# Patient Record
Sex: FEMALE
[De-identification: names, ages, dates, MRNs, and addresses within clinical notes are randomized per-mention and may not be internally consistent; named-entity substitution may affect disease eponyms.]

---

## 2021-08-18 ENCOUNTER — NURSE TRIAGE (OUTPATIENT)
Dept: OTHER | Facility: CLINIC | Age: 41
End: 2021-08-18

## 2021-08-18 NOTE — TELEPHONE ENCOUNTER
Pt states she had a vein surgery done and followed up with the physician who performed the procedure today. Pt states she has some swelling and pain to leg after after having procedure. Pt states she was was given Voltaren, atb and was told by physician to put heat on leg. Writer advised to follow physicians instructions and  if any worsening symptoms to call nurse line back or f/u with physician who did procedure. Pt also advised to f/u with PCP if needed. Pt verbalizes understanding.

## 2023-03-22 PROBLEM — N39.3 STRESS INCONTINENCE, FEMALE: Status: ACTIVE | Noted: 2023-03-22

## 2023-03-22 PROBLEM — L23.7 CONTACT DERMATITIS DUE TO POISON IVY: Status: ACTIVE | Noted: 2023-03-22

## 2023-03-22 PROBLEM — L30.9 ECZEMA OF HAND: Status: ACTIVE | Noted: 2023-03-22

## 2023-03-22 PROBLEM — N94.6 DYSMENORRHEA: Status: ACTIVE | Noted: 2023-03-22

## 2023-03-22 PROBLEM — O35.BXX0 ABNORMAL FETAL ECHOCARDIOGRAM AFFECTING ANTEPARTUM CARE OF MOTHER (HHS-HCC): Status: ACTIVE | Noted: 2023-03-22

## 2023-03-22 PROBLEM — R50.9 FEVER: Status: ACTIVE | Noted: 2023-03-22

## 2023-03-22 PROBLEM — R59.0 CERVICAL ADENOPATHY: Status: ACTIVE | Noted: 2023-03-22

## 2023-03-22 RX ORDER — CHLORHEXIDINE GLUCONATE ORAL RINSE 1.2 MG/ML
15 SOLUTION DENTAL
COMMUNITY
Start: 2022-04-29 | End: 2023-03-28

## 2023-03-22 RX ORDER — CHLORHEXIDINE GLUCONATE 40 MG/ML
SOLUTION TOPICAL
COMMUNITY
Start: 2022-04-29 | End: 2023-03-28

## 2023-03-24 RX ORDER — MULTIVITAMIN
1 TABLET ORAL DAILY
COMMUNITY

## 2023-03-26 ASSESSMENT — PROMIS GLOBAL HEALTH SCALE
RATE_GENERAL_HEALTH: VERY GOOD
RATE_AVERAGE_FATIGUE: MILD
RATE_SOCIAL_SATISFACTION: GOOD
EMOTIONAL_PROBLEMS: SOMETIMES
CARRYOUT_PHYSICAL_ACTIVITIES: COMPLETELY
CARRYOUT_SOCIAL_ACTIVITIES: VERY GOOD
RATE_AVERAGE_PAIN: 1
RATE_QUALITY_OF_LIFE: VERY GOOD
RATE_MENTAL_HEALTH: GOOD
RATE_PHYSICAL_HEALTH: VERY GOOD

## 2023-03-28 ENCOUNTER — LAB (OUTPATIENT)
Dept: LAB | Facility: LAB | Age: 43
End: 2023-03-28
Payer: COMMERCIAL

## 2023-03-28 ENCOUNTER — OFFICE VISIT (OUTPATIENT)
Dept: PRIMARY CARE | Facility: CLINIC | Age: 43
End: 2023-03-28
Payer: COMMERCIAL

## 2023-03-28 VITALS
TEMPERATURE: 98.2 F | HEART RATE: 58 BPM | HEIGHT: 67 IN | DIASTOLIC BLOOD PRESSURE: 72 MMHG | OXYGEN SATURATION: 98 % | WEIGHT: 184 LBS | BODY MASS INDEX: 28.88 KG/M2 | SYSTOLIC BLOOD PRESSURE: 108 MMHG

## 2023-03-28 DIAGNOSIS — Z00.00 ROUTINE GENERAL MEDICAL EXAMINATION AT A HEALTH CARE FACILITY: ICD-10-CM

## 2023-03-28 DIAGNOSIS — Z13.220 LIPID SCREENING: ICD-10-CM

## 2023-03-28 DIAGNOSIS — K59.00 CONSTIPATION, UNSPECIFIED CONSTIPATION TYPE: ICD-10-CM

## 2023-03-28 DIAGNOSIS — M79.672 FOOT PAIN, LEFT: ICD-10-CM

## 2023-03-28 DIAGNOSIS — Z00.00 ROUTINE GENERAL MEDICAL EXAMINATION AT A HEALTH CARE FACILITY: Primary | ICD-10-CM

## 2023-03-28 DIAGNOSIS — M54.2 NECK PAIN: ICD-10-CM

## 2023-03-28 DIAGNOSIS — R63.5 WEIGHT GAIN: ICD-10-CM

## 2023-03-28 DIAGNOSIS — R53.83 FATIGUE, UNSPECIFIED TYPE: ICD-10-CM

## 2023-03-28 DIAGNOSIS — N93.8 DUB (DYSFUNCTIONAL UTERINE BLEEDING): ICD-10-CM

## 2023-03-28 LAB
ALANINE AMINOTRANSFERASE (SGPT) (U/L) IN SER/PLAS: 21 U/L (ref 7–45)
ALBUMIN (G/DL) IN SER/PLAS: 4.4 G/DL (ref 3.4–5)
ALKALINE PHOSPHATASE (U/L) IN SER/PLAS: 48 U/L (ref 33–110)
ANION GAP IN SER/PLAS: 11 MMOL/L (ref 10–20)
ASPARTATE AMINOTRANSFERASE (SGOT) (U/L) IN SER/PLAS: 21 U/L (ref 9–39)
BILIRUBIN TOTAL (MG/DL) IN SER/PLAS: 0.7 MG/DL (ref 0–1.2)
CALCIUM (MG/DL) IN SER/PLAS: 10 MG/DL (ref 8.6–10.3)
CARBON DIOXIDE, TOTAL (MMOL/L) IN SER/PLAS: 31 MMOL/L (ref 21–32)
CHLORIDE (MMOL/L) IN SER/PLAS: 101 MMOL/L (ref 98–107)
CHOLESTEROL (MG/DL) IN SER/PLAS: 197 MG/DL (ref 0–199)
CHOLESTEROL IN HDL (MG/DL) IN SER/PLAS: 82.3 MG/DL
CHOLESTEROL/HDL RATIO: 2.4
CREATININE (MG/DL) IN SER/PLAS: 0.78 MG/DL (ref 0.5–1.05)
ERYTHROCYTE DISTRIBUTION WIDTH (RATIO) BY AUTOMATED COUNT: 13.2 % (ref 11.5–14.5)
ERYTHROCYTE MEAN CORPUSCULAR HEMOGLOBIN CONCENTRATION (G/DL) BY AUTOMATED: 33.5 G/DL (ref 32–36)
ERYTHROCYTE MEAN CORPUSCULAR VOLUME (FL) BY AUTOMATED COUNT: 90 FL (ref 80–100)
ERYTHROCYTES (10*6/UL) IN BLOOD BY AUTOMATED COUNT: 4.43 X10E12/L (ref 4–5.2)
GFR FEMALE: >90 ML/MIN/1.73M2
GLUCOSE (MG/DL) IN SER/PLAS: 80 MG/DL (ref 74–99)
HEMATOCRIT (%) IN BLOOD BY AUTOMATED COUNT: 40 % (ref 36–46)
HEMOGLOBIN (G/DL) IN BLOOD: 13.4 G/DL (ref 12–16)
LDL: 95 MG/DL (ref 0–99)
LEUKOCYTES (10*3/UL) IN BLOOD BY AUTOMATED COUNT: 6.3 X10E9/L (ref 4.4–11.3)
MAGNESIUM (MG/DL) IN SER/PLAS: 2.09 MG/DL (ref 1.6–2.4)
PLATELETS (10*3/UL) IN BLOOD AUTOMATED COUNT: 248 X10E9/L (ref 150–450)
POTASSIUM (MMOL/L) IN SER/PLAS: 3.8 MMOL/L (ref 3.5–5.3)
PROTEIN TOTAL: 6.8 G/DL (ref 6.4–8.2)
SODIUM (MMOL/L) IN SER/PLAS: 139 MMOL/L (ref 136–145)
THYROTROPIN (MIU/L) IN SER/PLAS BY DETECTION LIMIT <= 0.05 MIU/L: 2.68 MIU/L (ref 0.44–3.98)
THYROXINE (T4) FREE (NG/DL) IN SER/PLAS: 0.64 NG/DL (ref 0.61–1.12)
TRIGLYCERIDE (MG/DL) IN SER/PLAS: 101 MG/DL (ref 0–149)
UREA NITROGEN (MG/DL) IN SER/PLAS: 16 MG/DL (ref 6–23)
VLDL: 20 MG/DL (ref 0–40)

## 2023-03-28 PROCEDURE — 80061 LIPID PANEL: CPT

## 2023-03-28 PROCEDURE — 82607 VITAMIN B-12: CPT

## 2023-03-28 PROCEDURE — 1036F TOBACCO NON-USER: CPT | Performed by: FAMILY MEDICINE

## 2023-03-28 PROCEDURE — 82306 VITAMIN D 25 HYDROXY: CPT

## 2023-03-28 PROCEDURE — 83735 ASSAY OF MAGNESIUM: CPT

## 2023-03-28 PROCEDURE — 84439 ASSAY OF FREE THYROXINE: CPT

## 2023-03-28 PROCEDURE — 80053 COMPREHEN METABOLIC PANEL: CPT

## 2023-03-28 PROCEDURE — 84443 ASSAY THYROID STIM HORMONE: CPT

## 2023-03-28 PROCEDURE — 36415 COLL VENOUS BLD VENIPUNCTURE: CPT

## 2023-03-28 PROCEDURE — 83002 ASSAY OF GONADOTROPIN (LH): CPT

## 2023-03-28 PROCEDURE — 84146 ASSAY OF PROLACTIN: CPT

## 2023-03-28 PROCEDURE — 99214 OFFICE O/P EST MOD 30 MIN: CPT | Performed by: FAMILY MEDICINE

## 2023-03-28 PROCEDURE — 85027 COMPLETE CBC AUTOMATED: CPT

## 2023-03-28 PROCEDURE — 99396 PREV VISIT EST AGE 40-64: CPT | Performed by: FAMILY MEDICINE

## 2023-03-28 PROCEDURE — 83001 ASSAY OF GONADOTROPIN (FSH): CPT

## 2023-03-28 RX ORDER — METHYLPREDNISOLONE 4 MG/1
TABLET ORAL
Qty: 21 TABLET | Refills: 0 | Status: SHIPPED | OUTPATIENT
Start: 2023-03-28 | End: 2023-04-04

## 2023-03-28 ASSESSMENT — ENCOUNTER SYMPTOMS: FATIGUE: 1

## 2023-03-28 NOTE — PROGRESS NOTES
Subjective   Patient ID: Kaya Quintanilla is a 43 y.o. female who presents for Annual Exam. C/O left foot arch pain which onset 3 wks ago. Fasting for labs. Pt also thinks she is starting perimenopause.     Is here for routine physical    Having perimenopausal sxs since Nov,  worse since Jan      Vision changes,  had lasix done seeing ophthalmology got a drop recently     Seeing urology ,  for bladder incontinence had sling 2022     Periods erratic ,   skipped periods   Occ night sweats     Neck pain , headaches occ radiates into anterior chest   Over last 6 months   No injury , left neck into shoulder   Saw massage therapist   No neck xrays ,  tylenol some relief   Seeing chiropractor   Headaches triggered by neck pain few times per week       Constipation :  on laxatives,  has a hx of issues   Constipation or diarrhea :  taking magnesium and colace , psyllium   No GI in past , no colonoscopy     Mood swings ,  no patience lately , lack of motivation over last few months   Some stress lately   No hx of anxiety/ depression     Family hx no colon cancer , no breast     Weight normally at 168-172     Left foot with arch  3 weeks   No injury        ROS :    Any eye problems:    N  Frequent nasal congestion or sneezing:  N  Difficulty hearing:  N  Ear problems:   N  Asthma or wheezing:   N  Frequent cough:   N  Shortness of breath:N  Hemoptysis: N  Hx of TB: N  High blood pressure: N  Heart disease: N  Heart murmur:N  Chest pain or pressure with exertion:N  Leg pains with walking up hill: N  Fast heartbeat or palpitations:N  Varicose veins: y  Difficulty swallowing foods or liquids: N  Abdominal pains: y  Frequent indigestion or heartburn: N  Constipation: y  Diarrhea or loose stools: y  Weight changes recently: y  Change in bowel movements: y  An ulcer: N  Black stools: N  Jaundice, hepatitis or liver problems: N  Gallstones or gallbladder problems: N  Stomach or intestinal problems: N  Vomited blood : N  Blood in bowel  "movements: N  Sickle cell trait  or Anemia: N  Been refused as a blood donor: N  Problems with her kidney, bladder, or prostate: y  Loss of control of your urine: y  Pain or burning with urination: y  Blood in her urine: N  Trouble starting flow of urine: N  Frequent urination at night: N  History of venereal disease: N  Any skin problems: N  Diabetes: N  Thyroid disease: N  Frequent back pain: N  Pain or swelling around joints: N  Broken any bones: N  Frequent headaches: y  Dizziness: N  Have you ever had Seizures or convulsions: N  Have you ever temporarily lost control of your hand or foot : N   Had a stroke or been paralyzed : N  Temporarily lost your ability to speak: N  Fainted or lost consciousness: N  Hallucinations: N  Nervousness: N  Do you take medications for your nerves: N  Trouble falling asleep or staying asleep: N  Do you feel tired even after a good night sleep: y  Do you feel down in the dumps or depressed: N  Frequent crying: N  Using alcohol excessively: N  Any street drug use : N  Do have any other medical problems that are concerns :  y left foot arch pain.    Review of Systems   Constitutional:  Positive for fatigue.       Objective   /72   Pulse 58   Temp 36.8 °C (98.2 °F)   Ht 1.702 m (5' 7\")   Wt 83.5 kg (184 lb)   LMP 02/16/2023   SpO2 98%   BMI 28.82 kg/m²     Physical Exam  Constitutional:       General: She is not in acute distress.     Appearance: Normal appearance.   HENT:      Head: Normocephalic and atraumatic.      Right Ear: Tympanic membrane and ear canal normal.      Left Ear: Tympanic membrane and ear canal normal.      Mouth/Throat:      Mouth: Mucous membranes are moist.   Eyes:      Conjunctiva/sclera: Conjunctivae normal.      Pupils: Pupils are equal, round, and reactive to light.   Neck:      Vascular: No carotid bruit.   Cardiovascular:      Rate and Rhythm: Normal rate and regular rhythm.      Heart sounds: No murmur heard.  Pulmonary:      Effort: " Pulmonary effort is normal.      Breath sounds: Normal breath sounds. No wheezing or rhonchi.   Abdominal:      General: Bowel sounds are normal.      Palpations: Abdomen is soft.   Musculoskeletal:         General: No swelling.      Cervical back: No rigidity.   Lymphadenopathy:      Cervical: No cervical adenopathy.   Skin:     General: Skin is warm and dry.      Findings: No rash.   Neurological:      Mental Status: She is alert.     Neck range of motion intact, nontender  Left foot, some tenderness along the medial arch, normal arches  No plantar fascial insertion tenderness    Assessment/Plan   Problem List Items Addressed This Visit    None  Visit Diagnoses       Routine general medical examination at a health care facility    -  Primary    Relevant Medications    methylPREDNISolone (Medrol Dospak) 4 mg tablets    Other Relevant Orders    XR foot left 3+ views    XR cervical spine 2-3 views    CBC    Comprehensive Metabolic Panel    Thyroid Stimulating Hormone    Lipid Panel    FSH & LH    Thyroxine, Free    Vitamin B12    Prolactin    Magnesium    Vitamin D 25-Hydroxy,Total    Lipid screening        Relevant Medications    methylPREDNISolone (Medrol Dospak) 4 mg tablets    Other Relevant Orders    XR foot left 3+ views    XR cervical spine 2-3 views    CBC    Comprehensive Metabolic Panel    Thyroid Stimulating Hormone    Lipid Panel    FSH & LH    Thyroxine, Free    Vitamin B12    Prolactin    Magnesium    Vitamin D 25-Hydroxy,Total    DUB (dysfunctional uterine bleeding)        Relevant Medications    methylPREDNISolone (Medrol Dospak) 4 mg tablets    Other Relevant Orders    XR foot left 3+ views    XR cervical spine 2-3 views    CBC    Comprehensive Metabolic Panel    Thyroid Stimulating Hormone    Lipid Panel    FSH & LH    Thyroxine, Free    Vitamin B12    Prolactin    Magnesium    Vitamin D 25-Hydroxy,Total    Fatigue, unspecified type        Relevant Medications    methylPREDNISolone (Medrol Dospak) 4  mg tablets    Other Relevant Orders    XR foot left 3+ views    XR cervical spine 2-3 views    CBC    Comprehensive Metabolic Panel    Thyroid Stimulating Hormone    Lipid Panel    FSH & LH    Thyroxine, Free    Vitamin B12    Prolactin    Magnesium    Vitamin D 25-Hydroxy,Total    Weight gain        Relevant Medications    methylPREDNISolone (Medrol Dospak) 4 mg tablets    Other Relevant Orders    XR foot left 3+ views    XR cervical spine 2-3 views    CBC    Comprehensive Metabolic Panel    Thyroid Stimulating Hormone    Lipid Panel    FSH & LH    Thyroxine, Free    Vitamin B12    Prolactin    Magnesium    Vitamin D 25-Hydroxy,Total    Neck pain        Relevant Medications    methylPREDNISolone (Medrol Dospak) 4 mg tablets    Other Relevant Orders    XR foot left 3+ views    XR cervical spine 2-3 views    CBC    Comprehensive Metabolic Panel    Thyroid Stimulating Hormone    Lipid Panel    FSH & LH    Thyroxine, Free    Vitamin B12    Prolactin    Magnesium    Vitamin D 25-Hydroxy,Total    Constipation, unspecified constipation type        Relevant Medications    methylPREDNISolone (Medrol Dospak) 4 mg tablets    Other Relevant Orders    XR foot left 3+ views    XR cervical spine 2-3 views    CBC    Comprehensive Metabolic Panel    Thyroid Stimulating Hormone    Lipid Panel    FSH & LH    Thyroxine, Free    Vitamin B12    Prolactin    Magnesium    Vitamin D 25-Hydroxy,Total    Referral to Gastroenterology    Foot pain, left        Relevant Medications    methylPREDNISolone (Medrol Dospak) 4 mg tablets    Other Relevant Orders    XR foot left 3+ views    XR cervical spine 2-3 views    CBC    Comprehensive Metabolic Panel    Thyroid Stimulating Hormone    Lipid Panel    FSH & LH    Thyroxine, Free    Vitamin B12    Prolactin    Magnesium    Vitamin D 25-Hydroxy,Total

## 2023-03-28 NOTE — PATIENT INSTRUCTIONS
Get your blood work as ordered.  You should hear from our office with results whether they are normal are not within a few days.  Please call the office if you do not hear from us.    You should be getting cardiovascular exercise 3-5 times per week for 30-45 minutes.  This includes exercises such as running, brisk walking, biking or swimming.    Xrays     Steroids for neck and foot   Consider mm relxaer if neck pain persists     If blood work normal , consider SNRI,  like venlafaxine   Or consider [place] Wort       Headaches: Headaches are very common and there is multiple different reasons why people can have them.  In general most of the population have periodic headaches that respond to over-the-counter medications.  If you have a drastic change in the quality and quantity of your headaches, however, that may require further evaluation.  It is important to assess whether there are specific triggers for headaches.  A lot of people notice increased headaches with not sleeping , poor diet, stress or illness.  Make sure  you're getting adequate rest and eating a healthy diet.  Reducing your intake of caffeine can improve how often you have headaches as well.  Please notify your doctor if you have increased headaches or changes in headaches.  If you're taking prescription medications for your headaches and you're having side effects or problems with those medications or if they are not working please let us know.     Constipation  For constipation prevention/treatment:  Drink 8-10 glasses of water per day.  Take a daily fiber supplement Metamucil, 1 heaping tablespoon in 4-8ounces apple or prune juice daily.  Alternative is Metamucil wafer, eating 2 daily washing down with 4-8ounces apple or prune juice.  You can take an OTC stool softener like Colace 1-2 times per day.  In addition to above, if still have issues- use an OTC laxative called Miralax. This works very well and used to be available only by prescription.   Use one heaping tablespoon mixed in 4-8 ounces water IN ADDITION to the metamucil - just take it opposite time of day as metamucil.

## 2023-03-29 ENCOUNTER — TELEPHONE (OUTPATIENT)
Dept: PRIMARY CARE | Facility: CLINIC | Age: 43
End: 2023-03-29
Payer: COMMERCIAL

## 2023-03-29 LAB
CALCIDIOL (25 OH VITAMIN D3) (NG/ML) IN SER/PLAS: 47 NG/ML
COBALAMIN (VITAMIN B12) (PG/ML) IN SER/PLAS: 536 PG/ML (ref 211–911)
FOLLITROPIN (IU/L) IN SER/PLAS: 9.3 IU/L
LUTEINIZING HORMONE (IU/ML) IN SER/PLAS: 19.6 IU/L
PROLACTIN (UG/L) IN SER/PLAS: 7.2 UG/L (ref 3–20)

## 2023-03-29 NOTE — TELEPHONE ENCOUNTER
----- Message from Ada Bills MD sent at 3/29/2023  9:17 AM EDT -----  Lab results are normal hormone levels are normal, still ovulating, vitamin D normal, thyroid normal, cholesterol normal  See GI as recommended  Let me know if she wants to start any mood medications

## 2023-03-29 NOTE — TELEPHONE ENCOUNTER
Result Communication    Resulted Orders   CBC   Result Value Ref Range    WBC 6.3 4.4 - 11.3 x10E9/L    RBC 4.43 4.00 - 5.20 x10E12/L    Hemoglobin 13.4 12.0 - 16.0 g/dL    Hematocrit 40.0 36.0 - 46.0 %    MCV 90 80 - 100 fL    MCHC 33.5 32.0 - 36.0 g/dL    Platelets 248 150 - 450 x10E9/L    RDW 13.2 11.5 - 14.5 %   Comprehensive Metabolic Panel   Result Value Ref Range    Glucose 80 74 - 99 mg/dL    Sodium 139 136 - 145 mmol/L    Potassium 3.8 3.5 - 5.3 mmol/L    Chloride 101 98 - 107 mmol/L    Bicarbonate 31 21 - 32 mmol/L    Anion Gap 11 10 - 20 mmol/L    Urea Nitrogen 16 6 - 23 mg/dL    Creatinine 0.78 0.50 - 1.05 mg/dL    GFR Female >90 >90 mL/min/1.73m2      Comment:       CALCULATIONS OF ESTIMATED GFR ARE PERFORMED   USING THE 2021 CKD-EPI STUDY REFIT EQUATION   WITHOUT THE RACE VARIABLE FOR THE IDMS-TRACEABLE   CREATININE METHODS.    https://jasn.asnjournals.org/content/early/2021/09/22/ASN.8489779928    Calcium 10.0 8.6 - 10.3 mg/dL    Albumin 4.4 3.4 - 5.0 g/dL    Alkaline Phosphatase 48 33 - 110 U/L    Total Protein 6.8 6.4 - 8.2 g/dL    AST 21 9 - 39 U/L    Total Bilirubin 0.7 0.0 - 1.2 mg/dL    ALT (SGPT) 21 7 - 45 U/L      Comment:       Patients treated with Sulfasalazine may generate    falsely decreased results for ALT.   Thyroid Stimulating Hormone   Result Value Ref Range    TSH 2.68 0.44 - 3.98 mIU/L      Comment:       TSH testing is performed using different testing    methodology at [facility] than at other    [facility] hospitals. Direct result comparisons should    only be made within the same method.   Lipid Panel   Result Value Ref Range    Cholesterol 197 0 - 199 mg/dL      Comment:      .      AGE      DESIRABLE   BORDERLINE HIGH   HIGH     0-19 Y     0 - 169       170 - 199     >/= 200    20-24 Y     0 - 189       190 - 224     >/= 225         >24 Y     0 - 199       200 - 239     >/= 240   **All ranges are based on fasting samples. Specific   therapeutic targets will vary  based on patient-specific   cardiac risk.  .   Pediatric guidelines reference:Pediatrics 2011, 128(S5).   Adult guidelines reference: NCEP ATPIII Guidelines,     JAE 2001, 258:2486-97  .   Venipuncture immediately after or during the    administration of Metamizole may lead to falsely   low results. Testing should be performed immediately   prior to Metamizole dosing.    HDL 82.3 mg/dL      Comment:      .      AGE      VERY LOW   LOW     NORMAL    HIGH       0-19 Y       < 35   < 40     40-45     ----    20-24 Y       ----   < 40       >45     ----      >24 Y       ----   < 40     40-60      >60  .    Cholesterol/HDL Ratio 2.4       Comment:      REF VALUES  DESIRABLE  < 3.4  HIGH RISK  > 5.0    LDL 95 0 - 99 mg/dL      Comment:      .                           NEAR      BORD      AGE      DESIRABLE  OPTIMAL    HIGH     HIGH     VERY HIGH     0-19 Y     0 - 109     ---    110-129   >/= 130     ----    20-24 Y     0 - 119     ---    120-159   >/= 160     ----      >24 Y     0 -  99   100-129  130-159   160-189     >/=190  .    VLDL 20 0 - 40 mg/dL    Triglycerides 101 0 - 149 mg/dL      Comment:      .      AGE      DESIRABLE   BORDERLINE HIGH   HIGH     VERY HIGH   0 D-90 D    19 - 174         ----         ----        ----  91 D- 9 Y     0 -  74        75 -  99     >/= 100      ----    10-19 Y     0 -  89        90 - 129     >/= 130      ----    20-24 Y     0 - 114       115 - 149     >/= 150      ----         >24 Y     0 - 149       150 - 199    200- 499    >/= 500  .   Venipuncture immediately after or during the    administration of Metamizole may lead to falsely   low results. Testing should be performed immediately   prior to Metamizole dosing.   FSH & LH   Result Value Ref Range    Follicle Stimulating Hormone 9.3 IU/L      Comment:      REF VALUES  FOLLICULAR  2-12  MID-CYCLE     12-25  LUTEAL PHASE   2-12  MENOPAUSE       PREPUBERTY    50% ADULT  ADULT MALE     2-10  INFANTS        0-1    LUTROPIN  (IU/ML) IN SER/PLAS 19.6 IU/L      Comment:      REF VALUES  FOLLICULAR PHASE 1.9-12.5  MID-CYCLE        8.7-76.3  LUTEAL PHASE     0.5-16.9  POST MENOPAUSE   5.0-55.2  CHILDREN           0- 6.0  ADULT MALE 18-70 1.5- 9.3  ADULT MALE >70   3.1-34.6   Thyroxine, Free   Result Value Ref Range    Free T4 0.64 0.61 - 1.12 ng/dL      Comment:       Thyroxine Free testing is performed using different testing    methodology at [facility] than at other    [facility]. Direct result comparisons should    only be made within the same method.  .   Biotin can cause falsely elevated free T4 results. Patients   taking a Biotin dose of up to 10 mg/day should refrain from   taking Biotin for 24 hours before sample collection. Patient   taking a Biotin dose of >10 mg/day should consult with their   physician or the laboratory before the blood draw.   Vitamin B12   Result Value Ref Range    Vitamin B-12 536 211 - 911 pg/mL   Prolactin   Result Value Ref Range    Prolactin 7.2 3.0 - 20.0 ug/L   Magnesium   Result Value Ref Range    Magnesium 2.09 1.60 - 2.40 mg/dL   Vitamin D 25-Hydroxy,Total   Result Value Ref Range    Vitamin D, 25-Hydroxy 47 ng/mL      Comment:      .  DEFICIENCY:         < 20   NG/ML  INSUFFICIENCY:      20-29  NG/ML  SUFFICIENCY:         NG/ML    THIS ASSAY ACCURATELY QUANTIFIES THE SUM OF  VITAMIN D3, 25-HYDROXY AND VIT D2,25-HYDROXY.       10:50 AM      Results were successfully communicated with the patient and they acknowledged their understanding.

## 2023-03-30 ENCOUNTER — TELEPHONE (OUTPATIENT)
Dept: PRIMARY CARE | Facility: CLINIC | Age: 43
End: 2023-03-30
Payer: COMMERCIAL

## 2023-03-30 NOTE — RESULT ENCOUNTER NOTE
Please notify pt of radiology results foot x-ray normal but if continued issues I can refer her to podiatry

## 2023-03-30 NOTE — TELEPHONE ENCOUNTER
----- Message from Ada Bills MD sent at 3/30/2023 10:17 AM EDT -----  Please call pt about radiology results neck x-ray normal which only shows the bones it does not show ligaments and muscles

## 2023-03-30 NOTE — TELEPHONE ENCOUNTER
Result Communication    Resulted Orders   XR cervical spine complete 4-5 views    Narrative    Interpreted By:  ISABELA SILVERMAN MD  MRN: [id number]  Patient Name: MIA SCHNEIDER     STUDY:  SPINE, CERVICAL  MIN 4 VIEWS     INDICATION:  pain.     COMPARISON:  None     ACCESSION NUMBER(S):  [id number]     ORDERING CLINICIAN:  HARLAN KHOURY     FINDINGS:  Alignment normal. No evidence of fracture. Disc spaces normally  preserved.       Impression    Unremarkable cervical spine radiographs.       10:36 AM      Results were successfully communicated with the patient and they acknowledged their understanding.

## 2023-03-30 NOTE — TELEPHONE ENCOUNTER
----- Message from Ada Bills MD sent at 3/30/2023 10:18 AM EDT -----  Please notify pt of radiology results foot x-ray normal but if continued issues I can refer her to podiatry

## 2023-03-30 NOTE — TELEPHONE ENCOUNTER
Result Communication    Resulted Orders   XR foot left 3+ views    Narrative    Interpreted By:  ROBBIN GREER MD  MRN: [id number]  Patient Name: MIA SCHNEIDER     STUDY:  FOOT; COMPLETE, MIN 3 VIEWS;  3/28/2023 12:57 pm     INDICATION:  pain.     COMPARISON:  None.     ACCESSION NUMBER(S):  [id number]     ORDERING CLINICIAN:  HARLAN KHOURY     FINDINGS:  Left foot, three views     There is no fracture. There is no dislocation. No degenerative  changes seen.       Impression    Normal radiographs of the left foot       10:39 AM      Results were successfully communicated with the patient and they acknowledged their understanding.

## 2023-03-30 NOTE — RESULT ENCOUNTER NOTE
Please call pt about radiology results neck x-ray normal which only shows the bones it does not show ligaments and muscles

## 2023-09-11 ENCOUNTER — HOSPITAL ENCOUNTER (OUTPATIENT)
Dept: DATA CONVERSION | Facility: HOSPITAL | Age: 43
End: 2023-09-11
Attending: INTERNAL MEDICINE | Admitting: INTERNAL MEDICINE
Payer: COMMERCIAL

## 2023-09-11 DIAGNOSIS — K64.4 RESIDUAL HEMORRHOIDAL SKIN TAGS: ICD-10-CM

## 2023-09-11 DIAGNOSIS — Z12.11 ENCOUNTER FOR SCREENING FOR MALIGNANT NEOPLASM OF COLON: ICD-10-CM

## 2023-09-11 DIAGNOSIS — D12.7 BENIGN NEOPLASM OF RECTOSIGMOID JUNCTION: ICD-10-CM

## 2023-09-11 DIAGNOSIS — K64.0 FIRST DEGREE HEMORRHOIDS: ICD-10-CM

## 2023-09-11 DIAGNOSIS — D12.3 BENIGN NEOPLASM OF TRANSVERSE COLON: ICD-10-CM

## 2023-09-11 LAB — HCG, URINE: NEGATIVE

## 2023-09-18 LAB
COMPLETE PATHOLOGY REPORT: NORMAL
CONVERTED CLINICAL DIAGNOSIS-HISTORY: NORMAL
CONVERTED FINAL DIAGNOSIS: NORMAL
CONVERTED FINAL REPORT PDF LINK TO COPY AND PASTE: NORMAL
CONVERTED GROSS DESCRIPTION: NORMAL

## 2023-09-29 VITALS
SYSTOLIC BLOOD PRESSURE: 101 MMHG | RESPIRATION RATE: 18 BRPM | DIASTOLIC BLOOD PRESSURE: 73 MMHG | TEMPERATURE: 96.8 F | HEART RATE: 81 BPM | HEIGHT: 68 IN | WEIGHT: 179.9 LBS | BODY MASS INDEX: 27.26 KG/M2

## 2023-09-30 NOTE — H&P
"    History of Present Illness:   Pregnant/Lactating:  ·  Are You Pregnant no (1)   ·  Are You Currently Breastfeeding no (1)     History Present Illness:  Reason for surgery: Change in bowel habit, screening  for CRC   HPI:    Patient was evaluated in GI clinic due to altered bowel habit.  No previous colonoscopy.  No family history of colon cancer.    Allergies:        Allergies:  ·  Tape - Adhesive, Bandaids, Paper : Rash  ·  oxycodone : Unknown  ·  Zithromax : Unknown  ·  Benadryl : Unknown  ·  erythromycin : Unknown    Home Medication Review:   Home Medications Reviewed: yes     Impression/Procedure:   ·  Impression and Planned Procedure: Colonoscopy with biopsy       ERAS (Enhanced Recovery After Surgery):  ·  ERAS Patient: no       Vital Signs:  Temperature C: 36 degrees C   Temperature F: 96.8 degrees F   Heart Rate: 81 beats per minute   Respiratory Rate: 18 breath per minute   Blood Pressure Systolic: 101 mm/Hg   Blood Pressure Diastolic: 73 mm/Hg     Physical Exam by System:    Respiratory/Thorax: Patent airways, CTAB, normal  breath sounds with good chest expansion, thorax symmetric   Cardiovascular: Regular, rate and rhythm, no murmurs,  2+ equal pulses of the extremities, normal S 1and S 2     Consent:   COVID-19 Consent:  ·  COVID-19 Risk Consent Surgeon has reviewed key risks related to the risk of dea COVID-19 and if they contract COVID-19 what the risks are.       Electronic Signatures:  Epifanio Talbot)  (Signed 11-Sep-2023 08:34)   Authored: History of Present Illness, Allergies, Home  Medication Review, Impression/Procedure, ERAS, Physical Exam, Consent, Note Completion      Last Updated: 11-Sep-2023 08:34 by Epifanio Talbot)    References:  1.  Data Referenced From \"Patient Profile - Preop v3\" 11-Sep-2023 07:28   "

## 2023-11-27 ENCOUNTER — OFFICE VISIT (OUTPATIENT)
Dept: PRIMARY CARE | Facility: CLINIC | Age: 43
End: 2023-11-27
Payer: COMMERCIAL

## 2023-11-27 VITALS
SYSTOLIC BLOOD PRESSURE: 118 MMHG | OXYGEN SATURATION: 95 % | DIASTOLIC BLOOD PRESSURE: 79 MMHG | WEIGHT: 196 LBS | HEART RATE: 80 BPM | HEIGHT: 67 IN | BODY MASS INDEX: 30.76 KG/M2

## 2023-11-27 DIAGNOSIS — G43.009 MIGRAINE WITHOUT AURA AND WITHOUT STATUS MIGRAINOSUS, NOT INTRACTABLE: Primary | ICD-10-CM

## 2023-11-27 DIAGNOSIS — M54.2 NECK PAIN: ICD-10-CM

## 2023-11-27 DIAGNOSIS — R09.81 NASAL CONGESTION: ICD-10-CM

## 2023-11-27 PROBLEM — O35.BXX0 ABNORMAL FETAL ECHOCARDIOGRAM AFFECTING ANTEPARTUM CARE OF MOTHER (HHS-HCC): Status: RESOLVED | Noted: 2023-03-22 | Resolved: 2023-11-27

## 2023-11-27 PROCEDURE — 1036F TOBACCO NON-USER: CPT | Performed by: FAMILY MEDICINE

## 2023-11-27 PROCEDURE — 99214 OFFICE O/P EST MOD 30 MIN: CPT | Performed by: FAMILY MEDICINE

## 2023-11-27 RX ORDER — CYCLOBENZAPRINE HCL 10 MG
10 TABLET ORAL NIGHTLY PRN
Qty: 30 TABLET | Refills: 3 | Status: SHIPPED | OUTPATIENT
Start: 2023-11-27 | End: 2024-03-26

## 2023-11-27 RX ORDER — DROSPIRENONE 4 MG/1
4 TABLET, FILM COATED ORAL DAILY
COMMUNITY

## 2023-11-27 RX ORDER — BRIMONIDINE TARTRATE 1 MG/ML
SOLUTION/ DROPS OPHTHALMIC
COMMUNITY
Start: 2023-11-21

## 2023-11-27 RX ORDER — RIZATRIPTAN BENZOATE 10 MG/1
10 TABLET, ORALLY DISINTEGRATING ORAL ONCE AS NEEDED
Qty: 9 TABLET | Refills: 3 | Status: SHIPPED | OUTPATIENT
Start: 2023-11-27 | End: 2023-12-27

## 2023-11-27 NOTE — PATIENT INSTRUCTIONS
Headaches: Headaches are very common and there is multiple different reasons why people can have them.  In general most of the population have periodic headaches that respond to over-the-counter medications.  If you have a drastic change in the quality and quantity of your headaches, however, that may require further evaluation.  It is important to assess whether there are specific triggers for headaches.  A lot of people notice increased headaches with not sleeping , poor diet, stress or illness.  Make sure  you're getting adequate rest and eating a healthy diet.  Reducing your intake of caffeine can improve how often you have headaches as well.  Please notify your doctor if you have increased headaches or changes in headaches.  If you're taking prescription medications for your headaches and you're having side effects or problems with those medications or if they are not working please let us know.      Trial adding mm relaxer at night as needed       Consider depo shot also instead of pills

## 2023-11-27 NOTE — PROGRESS NOTES
"Subjective   Patient ID: Kaya Quintanilla is a 43 y.o. female who presents for Headache (Headache, last 3-4 days.  Pt on progesterone only BCP to hopefully stop the HA.   Pt cannot function when she has them. Happening at least at least monthly if not more.  Pt does hear crackles in ears at times.  ).    Monthly headaches   Nausea   Photophobia   Over last 3 months  :  tried maxalt was ,  worked some ,      Left posterior neck into eye   Some sinus pressure     Hx of nasal  polyp , tried nasal flushing   Doing flonase with some nasal congestion     On progesterone only ocp previously on combo pills     Saw chiropractor last week     Previously migraines were better off estrogen , on Slynd 1-2 months            Review of Systems    Objective   /79   Pulse 80   Ht 1.702 m (5' 7\")   Wt 88.9 kg (196 lb)   SpO2 95%   BMI 30.70 kg/m²     Physical Exam  Constitutional:       Appearance: Normal appearance.   HENT:      Head: Normocephalic and atraumatic.      Right Ear: Tympanic membrane and ear canal normal.      Left Ear: Tympanic membrane and ear canal normal.      Nose: No nasal deformity.      Right Sinus: No maxillary sinus tenderness or frontal sinus tenderness.      Left Sinus: No maxillary sinus tenderness or frontal sinus tenderness.      Mouth/Throat:      Mouth: Mucous membranes are moist. No oral lesions.      Tongue: No lesions.      Pharynx: Oropharynx is clear.   Neck:      Comments: Mild paraspinal muscle tenderness  Cardiovascular:      Rate and Rhythm: Normal rate and regular rhythm.   Pulmonary:      Effort: Pulmonary effort is normal.      Breath sounds: Normal breath sounds.   Musculoskeletal:      Cervical back: No tenderness.   Lymphadenopathy:      Cervical: No cervical adenopathy.   Neurological:      Mental Status: She is alert.   Psychiatric:         Mood and Affect: Mood normal.         Assessment/Plan   Problem List Items Addressed This Visit    None  Visit Diagnoses         " Codes    Migraine without aura and without status migrainosus, not intractable    -  Primary G43.009    Relevant Medications    cyclobenzaprine (Flexeril) 10 mg tablet    rizatriptan MLT (Maxalt-MLT) 10 mg disintegrating tablet    Nasal congestion     R09.81    Neck pain     M54.2

## 2024-01-18 ENCOUNTER — OFFICE VISIT (OUTPATIENT)
Dept: PRIMARY CARE | Facility: CLINIC | Age: 44
End: 2024-01-18
Payer: COMMERCIAL

## 2024-01-18 VITALS
TEMPERATURE: 98.1 F | HEIGHT: 67 IN | SYSTOLIC BLOOD PRESSURE: 122 MMHG | HEART RATE: 80 BPM | WEIGHT: 188 LBS | DIASTOLIC BLOOD PRESSURE: 82 MMHG | OXYGEN SATURATION: 97 % | BODY MASS INDEX: 29.51 KG/M2

## 2024-01-18 DIAGNOSIS — J01.00 ACUTE NON-RECURRENT MAXILLARY SINUSITIS: Primary | ICD-10-CM

## 2024-01-18 PROCEDURE — 99213 OFFICE O/P EST LOW 20 MIN: CPT | Performed by: FAMILY MEDICINE

## 2024-01-18 PROCEDURE — 1036F TOBACCO NON-USER: CPT | Performed by: FAMILY MEDICINE

## 2024-01-18 RX ORDER — DOXYCYCLINE 100 MG/1
100 CAPSULE ORAL 2 TIMES DAILY
Qty: 20 CAPSULE | Refills: 0 | Status: SHIPPED | OUTPATIENT
Start: 2024-01-18 | End: 2024-01-28

## 2024-01-18 RX ORDER — EFINACONAZOLE 100 MG/ML
SOLUTION TOPICAL
COMMUNITY
Start: 2024-01-16

## 2024-01-18 ASSESSMENT — ENCOUNTER SYMPTOMS
RHINORRHEA: 1
SHORTNESS OF BREATH: 0
NECK PAIN: 1
COUGH: 0
FEVER: 0
SORE THROAT: 0
VOMITING: 0
DIARRHEA: 0
HEADACHES: 1
ABDOMINAL PAIN: 0

## 2024-01-18 NOTE — PROGRESS NOTES
"Subjective   Patient ID: Kaya Quintanilla is a 44 y.o. female who presents for Sinusitis.    Flu 12/20   Sinus s/p Abx     Residual since then     Earache   There is pain in both ears. This is a recurrent problem. The current episode started more than 1 month ago. The problem occurs every few hours. The problem has been waxing and waning. There has been no fever. The fever has been present for Less than 1 day. The pain is at a severity of 5/10. Associated symptoms include headaches, hearing loss, neck pain and rhinorrhea. Pertinent negatives include no abdominal pain, coughing, diarrhea, ear discharge, rash, sore throat or vomiting.        Antibiotics end of dec     Last 2 days pain and pressure on left side sinuses       Review of Systems   Constitutional:  Negative for fever.   HENT:  Positive for ear pain, hearing loss and rhinorrhea. Negative for ear discharge and sore throat.    Respiratory:  Negative for cough and shortness of breath.    Gastrointestinal:  Negative for abdominal pain, diarrhea and vomiting.   Musculoskeletal:  Positive for neck pain.   Skin:  Negative for rash.   Neurological:  Positive for headaches.       Objective   /82   Pulse 80   Temp 36.7 °C (98.1 °F)   Ht 1.702 m (5' 7\")   Wt 85.3 kg (188 lb)   SpO2 97%   BMI 29.44 kg/m²     Physical Exam  Constitutional:       Appearance: Normal appearance.   HENT:      Head: Normocephalic and atraumatic.      Right Ear: Tympanic membrane and ear canal normal.      Left Ear: Tympanic membrane and ear canal normal.      Nose: No nasal deformity.      Right Sinus: No maxillary sinus tenderness or frontal sinus tenderness.      Left Sinus: Maxillary sinus tenderness and frontal sinus tenderness present.      Mouth/Throat:      Mouth: Mucous membranes are moist. No oral lesions.      Tongue: No lesions.      Pharynx: Oropharynx is clear.   Cardiovascular:      Rate and Rhythm: Normal rate and regular rhythm.   Pulmonary:      Effort: Pulmonary " effort is normal.      Breath sounds: Normal breath sounds.   Musculoskeletal:      Cervical back: No tenderness.   Lymphadenopathy:      Cervical: No cervical adenopathy.   Neurological:      Mental Status: She is alert.         Assessment/Plan        Home covid test negative yesterday. Had influenza B on 12/20/23.  Answers submitted by the patient for this visit:  Ear Pain Questionnaire (Submitted on 1/18/2024)  Chief Complaint: Ear pain  Affected ear: both  Chronicity: recurrent  Onset: more than 1 month ago  Progression since onset: waxing and waning  Frequency: every few hours  Fever: no fever  Fever duration: less than 1 day  Pain - numeric: 5/10  abdominal pain: No  ear discharge: No  rash: No  cough: No  headaches: Yes  rhinorrhea: Yes  diarrhea: No  hearing loss: Yes  sore throat: No  neck pain: Yes  vomiting: No

## 2024-01-18 NOTE — PATIENT INSTRUCTIONS
You may take over-the-counter medications as needed for symptom relief.  Call the office if your symptoms worsen such as high fever and worsening cough or increase in symptoms.     Follow up if symptoms worsen or persist.

## 2024-03-18 ENCOUNTER — APPOINTMENT (OUTPATIENT)
Dept: PRIMARY CARE | Facility: CLINIC | Age: 44
End: 2024-03-18
Payer: COMMERCIAL

## 2024-04-10 ENCOUNTER — OFFICE VISIT (OUTPATIENT)
Dept: PRIMARY CARE | Facility: CLINIC | Age: 44
End: 2024-04-10
Payer: COMMERCIAL

## 2024-04-10 VITALS
SYSTOLIC BLOOD PRESSURE: 108 MMHG | BODY MASS INDEX: 32.88 KG/M2 | WEIGHT: 209.5 LBS | HEIGHT: 67 IN | HEART RATE: 74 BPM | TEMPERATURE: 97.9 F | OXYGEN SATURATION: 96 % | DIASTOLIC BLOOD PRESSURE: 75 MMHG

## 2024-04-10 DIAGNOSIS — R13.19 ESOPHAGEAL DYSPHAGIA: ICD-10-CM

## 2024-04-10 DIAGNOSIS — Z13.220 LIPID SCREENING: ICD-10-CM

## 2024-04-10 DIAGNOSIS — R09.82 POST-NASAL DRIP: ICD-10-CM

## 2024-04-10 DIAGNOSIS — Z12.31 ENCOUNTER FOR SCREENING MAMMOGRAM FOR MALIGNANT NEOPLASM OF BREAST: ICD-10-CM

## 2024-04-10 DIAGNOSIS — Z00.00 ROUTINE GENERAL MEDICAL EXAMINATION AT A HEALTH CARE FACILITY: Primary | ICD-10-CM

## 2024-04-10 DIAGNOSIS — G43.909 MIGRAINE WITHOUT STATUS MIGRAINOSUS, NOT INTRACTABLE, UNSPECIFIED MIGRAINE TYPE: ICD-10-CM

## 2024-04-10 DIAGNOSIS — E04.9 THYROID ENLARGED: ICD-10-CM

## 2024-04-10 PROBLEM — L23.7 CONTACT DERMATITIS DUE TO POISON IVY: Status: RESOLVED | Noted: 2023-03-22 | Resolved: 2024-04-10

## 2024-04-10 PROCEDURE — 1036F TOBACCO NON-USER: CPT | Performed by: FAMILY MEDICINE

## 2024-04-10 PROCEDURE — 99213 OFFICE O/P EST LOW 20 MIN: CPT | Performed by: FAMILY MEDICINE

## 2024-04-10 PROCEDURE — 99396 PREV VISIT EST AGE 40-64: CPT | Performed by: FAMILY MEDICINE

## 2024-04-10 ASSESSMENT — PATIENT HEALTH QUESTIONNAIRE - PHQ9
1. LITTLE INTEREST OR PLEASURE IN DOING THINGS: NOT AT ALL
SUM OF ALL RESPONSES TO PHQ9 QUESTIONS 1 AND 2: 0
2. FEELING DOWN, DEPRESSED OR HOPELESS: NOT AT ALL

## 2024-04-10 NOTE — PATIENT INSTRUCTIONS
Get your blood work as ordered.  You should hear from our office with results whether they are normal are not within a few days.  Please call the office if you do not hear from us.     After you get testing done you will get notified from our office with regards to your results whether they are normal or not.  If you are registered in the electronic health record we will send you information in that system.  If you have any questions or need clarification please feel free to call the office.     Trial of   omeprazole   daily for 2 weeks   Then allergy medications clartin ,  nasal spray     To see what cough , and swallowing dose     If still with issues without clear cause consider GI or ENT       Menopause:  Menopause has a lot of variation from person-to-person.  On average menopause usually occurs anywhere from age 45-55.  Most women gradually taper off with their periods however some women just stop altogether.  If you have heavy bleeding or increased bleeding, that requires further work up.  Also if you go for longer than a year without a period that needs an evaluation as well.  The symptoms associated with menopause can include things like hot flashes or night sweats, mood swings are also common.  Some women note problems with vaginal dryness or pain with intercourse, decreased interest in sexual activity is fairly common as well.  Some women find that it is more difficult to lose weight around menopause.  The perimenopausal time frame when you have the symptoms varies a lot from woman to woman also.  Some women have no symptoms some women can have symptoms for years or decades.  We generally try to treat menopause with supportive therapy or over-the-counter supplements.  Doing things such as cooling down your body temperature with fans or opening doors or windows.  You can try over-the-counter supplements such as black cohosh to help with hot flashes.  If it is not a problem for you to have soy products you  can use an over-the-counter supplement that includes soy such as Estroven.  Using vaginal lubricants for intercourse is helpful.  It is  helpful to exercise regularly and eat a good healthy well-rounded diet.  Generally the risk of oral estrogen replacement is not worth the benefit given the studies that have shown indicated increased risk of things such as blood clots, breast cancer, or pulmonary embolism associated with estrogen.  There are some prescription medications we can use in more severe cases that can help with hot flashes, so let your doctor know if that's a problem.      You should be getting cardiovascular exercise 3-5 times per week for 30-45 minutes.  This includes exercises such as running, brisk walking, biking or swimming.         It is important to actively try to reduce your weight to become healthier.  There are several things you can do to try and lose weight.  You should be getting 30-45 minutes of cardiovascular exercise 3-5 times per week, activities such as running and jogging treadmill swimming and brisk walking are helpful.  You will also need to watch your portion control, decrease calorie intake overall.  Following a low carbohydrate diet is the most successful way to lose weight and take it off long-term.  In order to achieve weight loss it is important that you track exactly what your calorie intake is during the day.  I usually recommend doing some sort of formal program or Noni. there are lot of good Apps out there to help you follow your calorie intake such as weight watchers, Noom, Fitbit.  It is recommended that you reduce the intake of sweets, sugar, reduce carbohydrate intake.  Healthy diets with more whole grains, vegetables, fruits, nuts and seeds with plant oils are healthier diets than animal-based fats.  Reduce your intake of white bread, grains, potatoes, processed foods.       If you have a BMI  (height per weight ratio)  that is > 30 or > 27  with risk factors such as  diabetes, heart disease, high blood pressure or hypertension you may qualify for more intensive weight loss options.   I can give you a referral for to aggressively work on weight loss through a dietitian, possible medications and possible surgical interventions.  There are also a few options for weight loss medications.  There is an over-the-counter medication called orlistat ( Kalyan ) this works to prevent fat absorption, it can cause some diarrhea.  There are a couple of prescription medications that sometimes can help with weight loss: Bupropion, topiramate, Contrave  The newest class of medications that are indicated for weight loss are the GLP2 medications  : Mounjaro, Saxenda , Wegovy and Zepbound , these medications are injectables.  In order to proceed with getting a prescription for one of these medications it is your responsibility to check with your insurance to see what the cost of these medications would be for you and let me know if you would like to proceed with these medications.  If we do start these medications you will need to follow-up in a month for weight checks.  These medications  get you started on weight loss but you will need to continue with behavioral changes, dietary management and exercise to continue to keep the weight off.    If you are interested in trying 1 of these medications you need to check with your insurance formulary to see if they are covered.  If your insurance does not specifically cover the drug name Mounjaro, Saxenda,Wegovy or Zepbound WITHOUT a prior authorization then it is not covered for weight loss.    Ozempic,Semaglutide, Rybelsus, Trulicity are diabetic medications, not weight loss medications.  If your insurance states they need a prior authorization then they do not cover it for weight loss . We will not do prior authorizations for weight loss.  [employer] has a savings program online for  Zepbound on their website which is about half the cost of paying cash  for these medications.  There are other options that may be more cost effective if your insurance company does not cover the medication.  There are some online pharmacies that are providing medications as well as some local weight loss clinics that provide the GLP-1 medications that may be cheaper out-of-pocket.    There is significant safety concerns about compounded injectable GLP-1 medications.  The ingredients may be coming from unknown sources so I would not recommend these.  There are some pharmacies that offer compounded sublingual semaglutide.  If you can find this medication supplied by a [facility] approved pharmacy that is using the FDA approved semaglutide this is a safer bet.    If you do start 1 of these medications you will need to see me every 2 to 3 months for weight checks and reevaluations.

## 2024-04-10 NOTE — PROGRESS NOTES
Subjective   Patient ID: Kaya Quintanilla is a 44 y.o. female who presents for Annual Exam. States she is struggling with perimenopause sx's and post nasal drip since this past Winter. Pt has noticed herself choking in the evenings when trying to eat; raspy voice as well.         Occ trouble swallowing,  gets food stuck    Solids , most evenings   Did have one episode of coughing food up   No heartburn sxs       Chronic PND also , all day   Some coughing,  clearing throat   Ears clogged chronically   Hx nasal polyp   Doing claritin ,  flonase some relief     Not much over last few weeks   Some headaches     Migraines  have been ok    Periods every other month   On progesterone , erratic periods     Random palpitations   Mood is better        ROS :  ( No or Yes )  Any eye problems:    N  Frequent nasal congestion or sneezing:  y  Difficulty hearing:  N  Ear problems:   N  Asthma or wheezing:   N  Frequent cough:   y  Shortness of breath:N  Hemoptysis: N  Hx of TB: N  High blood pressure: N  Heart disease: N  Heart murmur:N  Chest pain or pressure with exertion:y  Leg pains with walking up hill: N  Fast heartbeat or palpitations:y  Varicose veins: y  Difficulty swallowing foods or liquids: y  Abdominal pains: N  Frequent indigestion or heartburn: N  Constipation: y  Diarrhea or loose stools: N  Weight changes recently: y  Change in bowel movements: N  An ulcer: N  Black stools: N  Jaundice, hepatitis or liver problems: N  Gallstones or gallbladder problems: N  Stomach or intestinal problems: N  Vomited blood : N  Blood in bowel movements: N  Sickle cell trait  or Anemia: N  Been refused as a blood donor: N  Problems with her kidney, bladder, or prostate: N  Loss of control of your urine: y  Pain or burning with urination: N  Blood in her urine: N  Trouble starting flow of urine: N  Frequent urination at night: y  History of venereal disease: N  Any skin problems: y  Diabetes: N  Thyroid disease: N  Frequent back pain:  "N  Pain or swelling around joints: N  Broken any bones: y  Frequent headaches: N  Dizziness: N  Have you ever had Seizures or convulsions: N  Have you ever temporarily lost control of your hand or foot : N   Had a stroke or been paralyzed : N  Temporarily lost your ability to speak: N  Fainted or lost consciousness: N  Hallucinations: N  Nervousness: N  Do you take medications for your nerves: y  Trouble falling asleep or staying asleep: y  Do you feel tired even after a good night sleep: y  Do you feel down in the dumps or depressed: N  Frequent crying: N  Using alcohol excessively: N  Any street drug use : N  Do have any other medical problems that are concerns :      Review of Systems   HENT:  Positive for congestion.        Objective   /75   Pulse 74   Temp 36.6 °C (97.9 °F)   Ht 1.702 m (5' 7\")   Wt 95 kg (209 lb 8 oz)   LMP 04/05/2024   SpO2 96%   BMI 32.81 kg/m²     Physical Exam  Constitutional:       General: She is not in acute distress.     Appearance: Normal appearance.   HENT:      Head: Normocephalic and atraumatic.      Right Ear: Tympanic membrane, ear canal and external ear normal.      Left Ear: Tympanic membrane, ear canal and external ear normal.      Nose: Nose normal.      Mouth/Throat:      Mouth: Mucous membranes are moist.      Pharynx: No oropharyngeal exudate or posterior oropharyngeal erythema.   Eyes:      Extraocular Movements: Extraocular movements intact.      Conjunctiva/sclera: Conjunctivae normal.      Pupils: Pupils are equal, round, and reactive to light.   Neck:      Comments: Mildly enlarged thyroid   Cardiovascular:      Rate and Rhythm: Normal rate and regular rhythm.      Heart sounds: No murmur heard.  Pulmonary:      Effort: Pulmonary effort is normal.      Breath sounds: Normal breath sounds.   Abdominal:      General: Bowel sounds are normal.      Palpations: Abdomen is soft.   Musculoskeletal:         General: Normal range of motion.      Cervical back: No " rigidity.   Lymphadenopathy:      Cervical: No cervical adenopathy.   Skin:     General: Skin is warm and dry.      Findings: No rash.   Neurological:      General: No focal deficit present.      Mental Status: She is alert and oriented to person, place, and time.      Cranial Nerves: No cranial nerve deficit.      Gait: Gait normal.   Psychiatric:         Mood and Affect: Mood normal.         Behavior: Behavior normal.         Assessment/Plan   Problem List Items Addressed This Visit    None  Visit Diagnoses         Codes    Routine general medical examination at a health care facility    -  Primary Z00.00    Relevant Orders    CBC    Comprehensive Metabolic Panel    Lipid Panel    Thyroid Stimulating Hormone    Thyroxine, Free    Food Allergy Profile IgE    Respiratory Allergy Profile IgE    Lipid screening     Z13.220    Relevant Orders    CBC    Comprehensive Metabolic Panel    Lipid Panel    Thyroid Stimulating Hormone    Thyroxine, Free    Food Allergy Profile IgE    Respiratory Allergy Profile IgE    Esophageal dysphagia     R13.19    Relevant Orders    CBC    Comprehensive Metabolic Panel    Lipid Panel    Thyroid Stimulating Hormone    Thyroxine, Free    Food Allergy Profile IgE    Respiratory Allergy Profile IgE    FL GI esophagram    Encounter for screening mammogram for malignant neoplasm of breast     Z12.31    Relevant Orders    BI mammo bilateral screening tomosynthesis    Post-nasal drip     R09.82    Migraine without status migrainosus, not intractable, unspecified migraine type     G43.909    Thyroid enlarged     E04.9    Relevant Orders    US thyroid

## 2024-04-15 ENCOUNTER — HOSPITAL ENCOUNTER (OUTPATIENT)
Dept: RADIOLOGY | Facility: HOSPITAL | Age: 44
Discharge: HOME | End: 2024-04-15
Payer: COMMERCIAL

## 2024-04-15 ENCOUNTER — LAB (OUTPATIENT)
Dept: LAB | Facility: LAB | Age: 44
End: 2024-04-15
Payer: COMMERCIAL

## 2024-04-15 VITALS — WEIGHT: 210 LBS | BODY MASS INDEX: 32.96 KG/M2 | HEIGHT: 67 IN

## 2024-04-15 DIAGNOSIS — E04.9 THYROID ENLARGED: ICD-10-CM

## 2024-04-15 DIAGNOSIS — Z12.31 ENCOUNTER FOR SCREENING MAMMOGRAM FOR MALIGNANT NEOPLASM OF BREAST: ICD-10-CM

## 2024-04-15 DIAGNOSIS — R13.19 ESOPHAGEAL DYSPHAGIA: ICD-10-CM

## 2024-04-15 DIAGNOSIS — Z13.220 LIPID SCREENING: ICD-10-CM

## 2024-04-15 DIAGNOSIS — Z00.00 ROUTINE GENERAL MEDICAL EXAMINATION AT A HEALTH CARE FACILITY: ICD-10-CM

## 2024-04-15 LAB
ALBUMIN SERPL BCP-MCNC: 4.3 G/DL (ref 3.4–5)
ALP SERPL-CCNC: 53 U/L (ref 33–110)
ALT SERPL W P-5'-P-CCNC: 20 U/L (ref 7–45)
ANION GAP SERPL CALC-SCNC: 22 MMOL/L (ref 10–20)
AST SERPL W P-5'-P-CCNC: 23 U/L (ref 9–39)
BILIRUB SERPL-MCNC: 0.5 MG/DL (ref 0–1.2)
BUN SERPL-MCNC: 19 MG/DL (ref 6–23)
CALCIUM SERPL-MCNC: 9.3 MG/DL (ref 8.6–10.3)
CHLORIDE SERPL-SCNC: 101 MMOL/L (ref 98–107)
CHOLEST SERPL-MCNC: 176 MG/DL (ref 0–199)
CHOLESTEROL/HDL RATIO: 2.7
CO2 SERPL-SCNC: 21 MMOL/L (ref 21–32)
CREAT SERPL-MCNC: 0.88 MG/DL (ref 0.5–1.05)
EGFRCR SERPLBLD CKD-EPI 2021: 82 ML/MIN/1.73M*2
ERYTHROCYTE [DISTWIDTH] IN BLOOD BY AUTOMATED COUNT: 13.2 % (ref 11.5–14.5)
GLUCOSE SERPL-MCNC: 86 MG/DL (ref 74–99)
HCT VFR BLD AUTO: 40.3 % (ref 36–46)
HDLC SERPL-MCNC: 66 MG/DL
HGB BLD-MCNC: 13.1 G/DL (ref 12–16)
LDLC SERPL CALC-MCNC: 93 MG/DL
MCH RBC QN AUTO: 30.3 PG (ref 26–34)
MCHC RBC AUTO-ENTMCNC: 32.5 G/DL (ref 32–36)
MCV RBC AUTO: 93 FL (ref 80–100)
NON HDL CHOLESTEROL: 110 MG/DL (ref 0–149)
NRBC BLD-RTO: 0 /100 WBCS (ref 0–0)
PLATELET # BLD AUTO: 285 X10*3/UL (ref 150–450)
POTASSIUM SERPL-SCNC: 4.1 MMOL/L (ref 3.5–5.3)
PROT SERPL-MCNC: 6.9 G/DL (ref 6.4–8.2)
RBC # BLD AUTO: 4.32 X10*6/UL (ref 4–5.2)
SODIUM SERPL-SCNC: 140 MMOL/L (ref 136–145)
T4 FREE SERPL-MCNC: 0.66 NG/DL (ref 0.61–1.12)
TRIGL SERPL-MCNC: 85 MG/DL (ref 0–149)
TSH SERPL-ACNC: 3.41 MIU/L (ref 0.44–3.98)
VLDL: 17 MG/DL (ref 0–40)
WBC # BLD AUTO: 5.6 X10*3/UL (ref 4.4–11.3)

## 2024-04-15 PROCEDURE — 76536 US EXAM OF HEAD AND NECK: CPT | Performed by: RADIOLOGY

## 2024-04-15 PROCEDURE — 82785 ASSAY OF IGE: CPT

## 2024-04-15 PROCEDURE — 76536 US EXAM OF HEAD AND NECK: CPT

## 2024-04-15 PROCEDURE — 86003 ALLG SPEC IGE CRUDE XTRC EA: CPT

## 2024-04-15 PROCEDURE — 77067 SCR MAMMO BI INCL CAD: CPT | Performed by: RADIOLOGY

## 2024-04-15 PROCEDURE — 77067 SCR MAMMO BI INCL CAD: CPT

## 2024-04-15 PROCEDURE — 36415 COLL VENOUS BLD VENIPUNCTURE: CPT

## 2024-04-15 PROCEDURE — 84443 ASSAY THYROID STIM HORMONE: CPT

## 2024-04-15 PROCEDURE — 84439 ASSAY OF FREE THYROXINE: CPT

## 2024-04-15 PROCEDURE — 80061 LIPID PANEL: CPT

## 2024-04-15 PROCEDURE — 77063 BREAST TOMOSYNTHESIS BI: CPT | Performed by: RADIOLOGY

## 2024-04-15 PROCEDURE — 85027 COMPLETE CBC AUTOMATED: CPT

## 2024-04-15 PROCEDURE — 80053 COMPREHEN METABOLIC PANEL: CPT

## 2024-04-16 LAB
A ALTERNATA IGE QN: <0.1 KU/L
A FUMIGATUS IGE QN: <0.1 KU/L
BERMUDA GRASS IGE QN: <0.1 KU/L
BOXELDER IGE QN: <0.1 KU/L
C HERBARUM IGE QN: <0.1 KU/L
CALIF WALNUT POLN IGE QN: <0.1 KU/L
CAT DANDER IGE QN: <0.1 KU/L
CLAM IGE QN: <0.1 KU/L
CMN PIGWEED IGE QN: <0.1 KU/L
CODFISH IGE QN: <0.1 KU/L
COMMON RAGWEED IGE QN: <0.1 KU/L
CORN IGE QN: <0.1
COTTONWOOD IGE QN: <0.1 KU/L
D FARINAE IGE QN: <0.1 KU/L
D PTERONYSS IGE QN: <0.1 KU/L
DOG DANDER IGE QN: <0.1 KU/L
EGG WHITE IGE QN: <0.1 KU/L
ENGL PLANTAIN IGE QN: <0.1 KU/L
GOOSEFOOT IGE QN: <0.1 KU/L
JOHNSON GRASS IGE QN: <0.1 KU/L
KENT BLUE GRASS IGE QN: <0.1 KU/L
LONDON PLANE IGE QN: <0.1 KU/L
MILK IGE QN: <0.1 KU/L
MT JUNIPER IGE QN: <0.1 KU/L
P NOTATUM IGE QN: <0.1 KU/L
PEANUT IGE QN: <0.1 KU/L
PECAN/HICK TREE IGE QN: <0.1 KU/L
ROACH IGE QN: <0.1 KU/L
SALTWORT IGE QN: <0.1 KU/L
SCALLOP IGE QN: <0.1 KU/L
SESAME SEED IGE QN: <0.1 KU/L
SHEEP SORREL IGE QN: <0.1 KU/L
SHRIMP IGE QN: <0.1 KU/L
SILVER BIRCH IGE QN: <0.1 KU/L
SOYBEAN IGE QN: <0.1 KU/L
TIMOTHY IGE QN: <0.1 KU/L
TOTAL IGE SMQN RAST: 21.8 KU/L
WALNUT IGE QN: <0.1 KU/L
WHEAT IGE QN: <0.1 KU/L
WHITE ASH IGE QN: <0.1 KU/L
WHITE ELM IGE QN: <0.1 KU/L
WHITE MULBERRY IGE QN: <0.1 KU/L
WHITE OAK IGE QN: <0.1 KU/L

## 2024-04-16 NOTE — RESULT ENCOUNTER NOTE
Labs are all normal: Cholesterol, electrolytes, the food and respiratory allergen panel were negative, this does not totally exclude allergens, if persisting issues we could consider having her see an allergist

## 2024-04-29 ENCOUNTER — APPOINTMENT (OUTPATIENT)
Dept: RADIOLOGY | Facility: HOSPITAL | Age: 44
End: 2024-04-29
Payer: COMMERCIAL

## 2024-05-13 ENCOUNTER — OFFICE VISIT (OUTPATIENT)
Dept: UROLOGY | Facility: CLINIC | Age: 44
End: 2024-05-13
Payer: COMMERCIAL

## 2024-05-13 DIAGNOSIS — N95.1 VASOMOTOR SYMPTOMS DUE TO MENOPAUSE: ICD-10-CM

## 2024-05-13 DIAGNOSIS — N32.81 OAB (OVERACTIVE BLADDER): Primary | ICD-10-CM

## 2024-05-13 PROCEDURE — 99214 OFFICE O/P EST MOD 30 MIN: CPT | Performed by: STUDENT IN AN ORGANIZED HEALTH CARE EDUCATION/TRAINING PROGRAM

## 2024-05-13 RX ORDER — PROGESTERONE 200 MG/1
CAPSULE ORAL
Qty: 36 CAPSULE | Refills: 3 | Status: SHIPPED | OUTPATIENT
Start: 2024-05-13

## 2024-05-13 RX ORDER — ESTRADIOL 0.03 MG/D
1 FILM, EXTENDED RELEASE TRANSDERMAL 2 TIMES WEEKLY
Qty: 8 PATCH | Refills: 11 | Status: SHIPPED | OUTPATIENT
Start: 2024-05-13 | End: 2025-05-13

## 2024-05-13 NOTE — PROGRESS NOTES
"HISTORY OF PRESENT ILLNESS:  Kaya Quintanilla is a 44 y.o. female who presents today for a yearly follow up visit. She reports she has hot flashes. She is waking up a lot at night as well. She occasionally has night sweats. She has not tried hormone replacements.            Past Medical History  She has a past medical history of Acute tonsillitis, unspecified (09/05/2016) and Other conditions influencing health status (08/15/2019).    Surgical History  She has a past surgical history that includes Other surgical history (06/10/2021); Other surgical history (08/15/2019); Other surgical history (08/15/2019); Incontinence surgery; and IR ablation vein.     Social History  She reports that she has never smoked. She has never used smokeless tobacco. She reports current alcohol use of about 2.0 standard drinks of alcohol per week. She reports that she does not use drugs.    Family History  Family History   Problem Relation Name Age of Onset    Other (dilation of aortic arch) Son          congenital    Other (cardiac disorder) Paternal Grandmother      Other (cardiac disorder) Paternal Grandfather          Allergies  Amoxicillin, Azithromycin, Diphenhydramine, Erythromycin, Oxycodone, Adhesive tape-silicones, and Ciclesonide      A comprehensive 10+ review of systems was negative except for: see hpi                          PHYSICAL EXAMINATION:  BP Readings from Last 3 Encounters:   04/10/24 108/75   01/18/24 122/82   11/27/23 118/79      Wt Readings from Last 3 Encounters:   04/15/24 95.3 kg (210 lb)   04/10/24 95 kg (209 lb 8 oz)   01/18/24 85.3 kg (188 lb)      BMI: Estimated body mass index is 32.89 kg/m² as calculated from the following:    Height as of 4/15/24: 1.702 m (5' 7\").    Weight as of 4/15/24: 95.3 kg (210 lb).  BSA: Estimated body surface area is 2.12 meters squared as calculated from the following:    Height as of 4/15/24: 1.702 m (5' 7\").    Weight as of 4/15/24: 95.3 kg (210 lb).  HEENT: Normocephalic, " atraumatic, PER EOMI, nonicteric, trachea normal, thyroid normal, oropharynx normal.  CARDIAC: regular rate & rhythm, S1 & S2 normal.  No heaves, thrills, gallops or murmurs.  LUNGS: Clear to auscultation, no spinal or CV tenderness.  EXTREMITIES: No evidence of cyanosis, clubbing or edema.               Assessment:  45 yo female presenting s/p sling and cystoscopy 5/11/2022     CASANDRA:  -doing well, all UI sx improved, no urgency and frequency         VSM:  -Discussed oxybutynin and veozah for hot flashes   Rx Estradiol patch  Rx progesterone 200 mg       All questions and concerns were answered and addressed.  The patient expressed understanding and agrees with the plan.     Quirino Clark MD    Scribe Attestation  By signing my name below, I, Peyton Clayton, Scribe   attest that this documentation has been prepared under the direction and in the presence of Quirino Clark MD.

## 2024-06-26 ENCOUNTER — LAB (OUTPATIENT)
Dept: LAB | Facility: LAB | Age: 44
End: 2024-06-26
Payer: COMMERCIAL

## 2024-06-26 DIAGNOSIS — L23.7 ALLERGIC CONTACT DERMATITIS DUE TO PLANTS, EXCEPT FOOD: ICD-10-CM

## 2024-06-26 DIAGNOSIS — B35.1 TINEA UNGUIUM: ICD-10-CM

## 2024-06-26 DIAGNOSIS — M72.1 KNUCKLE PADS: Primary | ICD-10-CM

## 2024-06-26 LAB
ALBUMIN SERPL BCP-MCNC: 4.7 G/DL (ref 3.4–5)
ALP SERPL-CCNC: 63 U/L (ref 33–110)
ALT SERPL W P-5'-P-CCNC: 23 U/L (ref 7–45)
ANION GAP SERPL CALC-SCNC: 15 MMOL/L (ref 10–20)
AST SERPL W P-5'-P-CCNC: 25 U/L (ref 9–39)
BILIRUB SERPL-MCNC: 0.4 MG/DL (ref 0–1.2)
BUN SERPL-MCNC: 23 MG/DL (ref 6–23)
CALCIUM SERPL-MCNC: 10.2 MG/DL (ref 8.6–10.6)
CHLORIDE SERPL-SCNC: 101 MMOL/L (ref 98–107)
CO2 SERPL-SCNC: 29 MMOL/L (ref 21–32)
CREAT SERPL-MCNC: 0.92 MG/DL (ref 0.5–1.05)
EGFRCR SERPLBLD CKD-EPI 2021: 78 ML/MIN/1.73M*2
GLUCOSE SERPL-MCNC: 88 MG/DL (ref 74–99)
POTASSIUM SERPL-SCNC: 5 MMOL/L (ref 3.5–5.3)
PROT SERPL-MCNC: 7.2 G/DL (ref 6.4–8.2)
SODIUM SERPL-SCNC: 140 MMOL/L (ref 136–145)

## 2024-06-26 PROCEDURE — 36415 COLL VENOUS BLD VENIPUNCTURE: CPT

## 2024-06-26 PROCEDURE — 80053 COMPREHEN METABOLIC PANEL: CPT

## 2024-09-11 NOTE — PROGRESS NOTES
"HISTORY OF PRESENT ILLNESS:  Kaya Quintanilla is a 44 y.o. female who presents today for a follow up visit. She states she is doing well at this time. No issues with HRT, helping a lot.          Past Medical History  She has a past medical history of Acute tonsillitis, unspecified (09/05/2016) and Other conditions influencing health status (08/15/2019).    Surgical History  She has a past surgical history that includes Other surgical history (06/10/2021); Other surgical history (08/15/2019); Other surgical history (08/15/2019); Incontinence surgery; and IR ablation vein.     Social History  She reports that she has never smoked. She has never used smokeless tobacco. She reports current alcohol use of about 2.0 standard drinks of alcohol per week. She reports that she does not use drugs.    Family History  Family History   Problem Relation Name Age of Onset    Other (dilation of aortic arch) Son          congenital    Other (cardiac disorder) Paternal Grandmother      Other (cardiac disorder) Paternal Grandfather          Allergies  Amoxicillin, Azithromycin, Diphenhydramine, Erythromycin, Oxycodone, Adhesive tape-silicones, and Ciclesonide      A comprehensive 10+ review of systems was negative except for: see hpi                          PHYSICAL EXAMINATION:  BP Readings from Last 3 Encounters:   04/10/24 108/75   01/18/24 122/82   11/27/23 118/79      Wt Readings from Last 3 Encounters:   04/15/24 95.3 kg (210 lb)   04/10/24 95 kg (209 lb 8 oz)   01/18/24 85.3 kg (188 lb)      BMI: Estimated body mass index is 32.89 kg/m² as calculated from the following:    Height as of 4/15/24: 1.702 m (5' 7\").    Weight as of 4/15/24: 95.3 kg (210 lb).  BSA: Estimated body surface area is 2.12 meters squared as calculated from the following:    Height as of 4/15/24: 1.702 m (5' 7\").    Weight as of 4/15/24: 95.3 kg (210 lb).  HEENT: Normocephalic, atraumatic, PER EOMI, nonicteric, trachea normal, thyroid normal, oropharynx " normal.  CARDIAC: regular rate & rhythm, S1 & S2 normal.  No heaves, thrills, gallops or murmurs.  LUNGS: Clear to auscultation, no spinal or CV tenderness.  EXTREMITIES: No evidence of cyanosis, clubbing or edema.               Assessment:  43 yo female with CASANDRA  s/p sling and cystoscopy 5/11/2022 and VSM      CASANDRA:  -doing well, all UI sx improved, no urgency and frequency           VSM:    continue Estradiol patch and progesterone 200 mg, started 5/2024      Follow up in 6 months       All questions and concerns were answered and addressed.  The patient expressed understanding and agrees with the plan.     Quirino Clark MD    Scribe Attestation  By signing my name below, IPeyton, Scribverena   attest that this documentation has been prepared under the direction and in the presence of Quirino Clark MD.

## 2024-09-12 ENCOUNTER — APPOINTMENT (OUTPATIENT)
Dept: UROLOGY | Facility: CLINIC | Age: 44
End: 2024-09-12
Payer: COMMERCIAL

## 2024-09-12 DIAGNOSIS — N39.3 SUI (STRESS URINARY INCONTINENCE, FEMALE): ICD-10-CM

## 2024-09-12 DIAGNOSIS — N95.1 VASOMOTOR SYMPTOMS DUE TO MENOPAUSE: Primary | ICD-10-CM

## 2024-09-12 PROCEDURE — 99213 OFFICE O/P EST LOW 20 MIN: CPT | Performed by: STUDENT IN AN ORGANIZED HEALTH CARE EDUCATION/TRAINING PROGRAM

## 2024-10-02 ENCOUNTER — OFFICE VISIT (OUTPATIENT)
Dept: PRIMARY CARE | Facility: CLINIC | Age: 44
End: 2024-10-02
Payer: COMMERCIAL

## 2024-10-02 ENCOUNTER — HOSPITAL ENCOUNTER (OUTPATIENT)
Dept: RADIOLOGY | Facility: HOSPITAL | Age: 44
Discharge: HOME | End: 2024-10-02
Payer: COMMERCIAL

## 2024-10-02 ENCOUNTER — LAB (OUTPATIENT)
Dept: LAB | Facility: LAB | Age: 44
End: 2024-10-02
Payer: COMMERCIAL

## 2024-10-02 VITALS
TEMPERATURE: 98.1 F | BODY MASS INDEX: 31.23 KG/M2 | WEIGHT: 199 LBS | DIASTOLIC BLOOD PRESSURE: 74 MMHG | SYSTOLIC BLOOD PRESSURE: 107 MMHG | OXYGEN SATURATION: 94 % | HEART RATE: 92 BPM | HEIGHT: 67 IN

## 2024-10-02 DIAGNOSIS — J18.9 COMMUNITY ACQUIRED PNEUMONIA OF RIGHT MIDDLE LOBE OF LUNG: Primary | ICD-10-CM

## 2024-10-02 DIAGNOSIS — J18.9 COMMUNITY ACQUIRED PNEUMONIA OF RIGHT MIDDLE LOBE OF LUNG: ICD-10-CM

## 2024-10-02 DIAGNOSIS — J18.9 COMMUNITY ACQUIRED PNEUMONIA OF LEFT LOWER LOBE OF LUNG: ICD-10-CM

## 2024-10-02 DIAGNOSIS — B35.1 TINEA UNGUIUM: Primary | ICD-10-CM

## 2024-10-02 DIAGNOSIS — M72.1 KNUCKLE PADS: ICD-10-CM

## 2024-10-02 LAB
ALBUMIN SERPL BCP-MCNC: 3.9 G/DL (ref 3.4–5)
ALP SERPL-CCNC: 79 U/L (ref 33–110)
ALT SERPL W P-5'-P-CCNC: 18 U/L (ref 7–45)
ANION GAP SERPL CALC-SCNC: 13 MMOL/L (ref 10–20)
AST SERPL W P-5'-P-CCNC: 18 U/L (ref 9–39)
BILIRUB SERPL-MCNC: 0.5 MG/DL (ref 0–1.2)
BUN SERPL-MCNC: 9 MG/DL (ref 6–23)
CALCIUM SERPL-MCNC: 9.2 MG/DL (ref 8.6–10.3)
CHLORIDE SERPL-SCNC: 99 MMOL/L (ref 98–107)
CO2 SERPL-SCNC: 31 MMOL/L (ref 21–32)
CREAT SERPL-MCNC: 0.75 MG/DL (ref 0.5–1.05)
EGFRCR SERPLBLD CKD-EPI 2021: >90 ML/MIN/1.73M*2
GLUCOSE SERPL-MCNC: 85 MG/DL (ref 74–99)
POTASSIUM SERPL-SCNC: 4.1 MMOL/L (ref 3.5–5.3)
PROT SERPL-MCNC: 6.8 G/DL (ref 6.4–8.2)
SODIUM SERPL-SCNC: 139 MMOL/L (ref 136–145)

## 2024-10-02 PROCEDURE — 99214 OFFICE O/P EST MOD 30 MIN: CPT | Performed by: FAMILY MEDICINE

## 2024-10-02 PROCEDURE — 1036F TOBACCO NON-USER: CPT | Performed by: FAMILY MEDICINE

## 2024-10-02 PROCEDURE — 80053 COMPREHEN METABOLIC PANEL: CPT

## 2024-10-02 PROCEDURE — 3008F BODY MASS INDEX DOCD: CPT | Performed by: FAMILY MEDICINE

## 2024-10-02 PROCEDURE — 71046 X-RAY EXAM CHEST 2 VIEWS: CPT

## 2024-10-02 PROCEDURE — 36415 COLL VENOUS BLD VENIPUNCTURE: CPT

## 2024-10-02 RX ORDER — TERBINAFINE HYDROCHLORIDE 250 MG/1
250 TABLET ORAL DAILY
COMMUNITY

## 2024-10-02 RX ORDER — ALBUTEROL SULFATE 90 UG/1
2 INHALANT RESPIRATORY (INHALATION) EVERY 4 HOURS PRN
Qty: 8 G | Refills: 0 | Status: SHIPPED | OUTPATIENT
Start: 2024-10-02 | End: 2025-10-02

## 2024-10-02 RX ORDER — DOXYCYCLINE 100 MG/1
100 CAPSULE ORAL 2 TIMES DAILY
Qty: 14 CAPSULE | Refills: 0 | Status: SHIPPED | OUTPATIENT
Start: 2024-10-02 | End: 2024-10-09

## 2024-10-02 RX ORDER — METHYLPREDNISOLONE 4 MG/1
TABLET ORAL
Qty: 21 TABLET | Refills: 0 | Status: SHIPPED | OUTPATIENT
Start: 2024-10-02 | End: 2024-10-09

## 2024-10-02 RX ORDER — AMOXICILLIN 500 MG/1
1000 CAPSULE ORAL EVERY 12 HOURS SCHEDULED
Qty: 40 CAPSULE | Refills: 0 | Status: SHIPPED | OUTPATIENT
Start: 2024-10-02 | End: 2024-10-12

## 2024-10-02 ASSESSMENT — ENCOUNTER SYMPTOMS
FACIAL SWELLING: 0
PALPITATIONS: 0
DIARRHEA: 0
CHILLS: 0
BLOOD IN STOOL: 0
CHEST TIGHTNESS: 1
FATIGUE: 1
VOMITING: 0
RHINORRHEA: 0
NAUSEA: 0
CONSTIPATION: 0
SORE THROAT: 0
ABDOMINAL PAIN: 0
SHORTNESS OF BREATH: 1
HEADACHES: 1
FEVER: 0
COUGH: 1

## 2024-10-02 NOTE — PROGRESS NOTES
Subjective   Patient ID: Kaya Quintanilla is a 44 y.o. female who presents for Cough.    Cough  Associated symptoms include headaches, postnasal drip and shortness of breath. Pertinent negatives include no chest pain, chills, ear pain, fever, rhinorrhea or sore throat.      Pt had covid early Sep and tested negative few days later. Had dry cough while she had covid. Started to feel better and started working out for a few days and then it worsened again on Sep 28th. Usually needs a few pillows to sleep. Has also noticed sinus drainage. Today she also has a headache due to lack of sleep due to cough. Lungs feel like they are burning. Tried using an old inhaler, which helped clear up her airways more. Has been doing mucinex consistently and intermittent Claritin. Has also had tea and cough drops that have helped. Has also tried robitussin and muscle relaxants at night, which helped.  Garage anatoliy redone last week - wondering if there is an environmental trigger.     Review of Systems   Constitutional:  Positive for fatigue. Negative for chills and fever.   HENT:  Positive for postnasal drip. Negative for ear discharge, ear pain, facial swelling, rhinorrhea, sneezing and sore throat.    Eyes:  Negative for visual disturbance.   Respiratory:  Positive for cough, chest tightness and shortness of breath.    Cardiovascular:  Negative for chest pain, palpitations and leg swelling.   Gastrointestinal:  Negative for abdominal pain, blood in stool, constipation, diarrhea, nausea and vomiting.   Neurological:  Positive for headaches.   All other systems reviewed and are negative.        Objective   There were no vitals taken for this visit.    Physical Exam  Constitutional:       Appearance: Normal appearance.   HENT:      Head: Normocephalic and atraumatic.      Right Ear: Tympanic membrane, ear canal and external ear normal.      Left Ear: Tympanic membrane, ear canal and external ear normal.      Nose: Nose normal.       Mouth/Throat:      Mouth: Mucous membranes are moist.      Pharynx: Oropharynx is clear.      Comments: Some drainage in throat.  Cardiovascular:      Rate and Rhythm: Normal rate and regular rhythm.      Heart sounds: Normal heart sounds.   Pulmonary:      Breath sounds: Wheezing and rhonchi present.      Comments: Crackles appreciated in R posterior lung. Wheezing present diffusely.  Neurological:      Mental Status: She is alert.             Assessment/Plan   Problem List Items Addressed This Visit    None  Visit Diagnoses         Codes    Community acquired pneumonia of right middle lobe of lung    -  Primary J18.9    Relevant Medications    methylPREDNISolone (Medrol Dospak) 4 mg tablets    amoxicillin (Amoxil) 500 mg capsule    albuterol (Ventolin HFA) 90 mcg/actuation inhaler    Other Relevant Orders    XR chest 2 views

## 2024-10-02 NOTE — PATIENT INSTRUCTIONS
You may take over-the-counter medications as needed for symptom relief.  Call the office if your symptoms worsen such as high fever and worsening cough or increase in symptoms.     Follow up if symptoms worsen or persist.      CHEST XRAY       Treat with inhaler, steroids, antibiotics  If positive for pneumonia add on doxycycline

## 2024-10-02 NOTE — PROGRESS NOTES
Subjective   Patient ID: Kaya Quintanilla is a 44 y.o. female who presents for Cough. States she had covid back in August. Cough has been non-productive; taking Mucinex and antihistamines prn; old albuterol inhaler and old benzoate in addition to Robitussin and cough drops.     HPI     Persisting cough over the last few weeks  No fevers  No shortness of breath  Some fatigue    Review of Systems    Objective   There were no vitals taken for this visit.    Physical Exam  Constitutional:       Appearance: Normal appearance.   HENT:      Head: Normocephalic and atraumatic.      Right Ear: Tympanic membrane and ear canal normal.      Left Ear: Tympanic membrane and ear canal normal.      Nose: No nasal deformity.      Right Sinus: No maxillary sinus tenderness or frontal sinus tenderness.      Left Sinus: No maxillary sinus tenderness or frontal sinus tenderness.      Mouth/Throat:      Mouth: Mucous membranes are moist. No oral lesions.      Tongue: No lesions.      Pharynx: Oropharynx is clear.   Cardiovascular:      Rate and Rhythm: Normal rate and regular rhythm.   Pulmonary:      Comments: End expiratory wheezes bilaterally  Rhonchi right midlung  Musculoskeletal:      Cervical back: No tenderness.   Lymphadenopathy:      Cervical: No cervical adenopathy.   Neurological:      General: No focal deficit present.      Mental Status: She is alert and oriented to person, place, and time.   Psychiatric:         Mood and Affect: Mood normal.         Behavior: Behavior normal.         Assessment/Plan   Problem List Items Addressed This Visit    None  Visit Diagnoses         Codes    Community acquired pneumonia of right middle lobe of lung    -  Primary J18.9    Relevant Medications    methylPREDNISolone (Medrol Dospak) 4 mg tablets    amoxicillin (Amoxil) 500 mg capsule    albuterol (Ventolin HFA) 90 mcg/actuation inhaler    Other Relevant Orders    XR chest 2 views

## 2024-10-14 ENCOUNTER — OFFICE VISIT (OUTPATIENT)
Dept: PRIMARY CARE | Facility: CLINIC | Age: 44
End: 2024-10-14
Payer: COMMERCIAL

## 2024-10-14 VITALS
HEIGHT: 67 IN | DIASTOLIC BLOOD PRESSURE: 74 MMHG | OXYGEN SATURATION: 93 % | SYSTOLIC BLOOD PRESSURE: 110 MMHG | HEART RATE: 99 BPM | BODY MASS INDEX: 31.23 KG/M2 | WEIGHT: 199 LBS | TEMPERATURE: 97.7 F

## 2024-10-14 DIAGNOSIS — J40 BRONCHITIS: ICD-10-CM

## 2024-10-14 DIAGNOSIS — J18.9 COMMUNITY ACQUIRED PNEUMONIA OF LEFT LOWER LOBE OF LUNG: Primary | ICD-10-CM

## 2024-10-14 PROBLEM — R50.9 FEVER: Status: RESOLVED | Noted: 2023-03-22 | Resolved: 2024-10-14

## 2024-10-14 PROCEDURE — 3008F BODY MASS INDEX DOCD: CPT | Performed by: FAMILY MEDICINE

## 2024-10-14 PROCEDURE — 99213 OFFICE O/P EST LOW 20 MIN: CPT | Performed by: FAMILY MEDICINE

## 2024-10-14 PROCEDURE — 1036F TOBACCO NON-USER: CPT | Performed by: FAMILY MEDICINE

## 2024-10-14 RX ORDER — PREDNISONE 10 MG/1
TABLET ORAL
Qty: 20 TABLET | Refills: 0 | Status: SHIPPED | OUTPATIENT
Start: 2024-10-14 | End: 2024-10-24

## 2024-10-14 ASSESSMENT — ENCOUNTER SYMPTOMS
HEADACHES: 1
SHORTNESS OF BREATH: 0
SORE THROAT: 0
CHILLS: 0
WHEEZING: 1
FATIGUE: 1
ABDOMINAL PAIN: 0
SHORTNESS OF BREATH: 1
COUGH: 1
SINUS PRESSURE: 0
SINUS PAIN: 0
FEVER: 0
CHEST TIGHTNESS: 1
RHINORRHEA: 0

## 2024-10-14 NOTE — PATIENT INSTRUCTIONS
Follow up if symptoms worsen or persist.     You may take over-the-counter medications as needed for symptom relief.  Call the office if your symptoms worsen such as high fever and worsening cough or increase in symptoms.        Steroid taper     Repeat chest in 2-3 weeks

## 2024-10-14 NOTE — PROGRESS NOTES
"Subjective   Kaya Quintanilla is a 44 y.o. female who presents for URI (Pt seen 10/2, dx pneumonia.  Pt states she felt a bit better, energy, cough is barely productive.  Sounds tight while talking.  Pt going on a flight this Saturday.  Pt taking mucinex. ).    HPI:  Since last visit 10/02 when diagnosed with pneumonia and prescribed amoxicillin, doxycycline, and methylprednisolone, has gotten better. Still feels chest tightness and has coughing fits. Coughing up white or green-yellow mucous. Still coughing at night and with exertion.    Review of Systems   Constitutional:  Positive for fatigue. Negative for chills and fever.   HENT:  Positive for postnasal drip. Negative for ear discharge, ear pain, rhinorrhea, sinus pressure, sinus pain and sore throat.         Loosing voice and it feels tight, though not sore.   Respiratory:  Positive for cough, chest tightness, shortness of breath and wheezing.         SOB with coughing fit.   Cardiovascular:  Positive for chest pain.   Gastrointestinal:  Negative for abdominal pain.   Neurological:  Positive for headaches.         Objective   /74   Pulse 99   Temp 36.5 °C (97.7 °F)   Ht 1.702 m (5' 7\")   Wt 90.3 kg (199 lb)   SpO2 93%   BMI 31.17 kg/m²     Physical Exam  Constitutional:       Appearance: She is ill-appearing.   HENT:      Right Ear: Tympanic membrane, ear canal and external ear normal.      Left Ear: Tympanic membrane, ear canal and external ear normal.      Nose: Nose normal.      Mouth/Throat:      Mouth: Mucous membranes are moist.      Pharynx: Oropharynx is clear.   Cardiovascular:      Rate and Rhythm: Normal rate and regular rhythm.      Pulses: Normal pulses.      Heart sounds: Normal heart sounds.   Pulmonary:      Effort: Pulmonary effort is normal.      Breath sounds: Wheezing and rhonchi present.      Comments: Diffuse end-expiratory wheezes and rhonchi.  Neurological:      Mental Status: She is alert.             Assessment/Plan "   Problem List Items Addressed This Visit    None  Visit Diagnoses         Codes    Community acquired pneumonia of left lower lobe of lung    -  Primary J18.9    Relevant Medications    predniSONE (Deltasone) 10 mg tablet    Bronchitis     J40

## 2024-10-14 NOTE — PROGRESS NOTES
"Subjective   Patient ID: Kaya Quintanilla is a 44 y.o. female who presents for URI (Pt seen 10/2, dx pneumonia.  Pt states she felt a bit better, energy, cough is barely productive.  Sounds tight while talking.  Pt going on a flight this Saturday.  Pt taking mucinex. ).    Persisting sxs after CAP      CAP LLL and lingula on CXR     Still with loosening of cough   Some burning in chest   Fatigue     Steroids with some relief of cough       Prednisone not allergic            Review of Systems   Constitutional:  Negative for fever.   HENT:  Negative for congestion and sore throat.    Respiratory:  Positive for cough. Negative for shortness of breath.        Objective   /74   Pulse 99   Temp 36.5 °C (97.7 °F)   Ht 1.702 m (5' 7\")   Wt 90.3 kg (199 lb)   SpO2 93%   BMI 31.17 kg/m²     Physical Exam  Constitutional:       Appearance: Normal appearance.   HENT:      Head: Normocephalic and atraumatic.      Right Ear: Tympanic membrane and ear canal normal.      Left Ear: Tympanic membrane and ear canal normal.      Nose: No nasal deformity.      Right Sinus: No maxillary sinus tenderness or frontal sinus tenderness.      Left Sinus: No maxillary sinus tenderness or frontal sinus tenderness.      Mouth/Throat:      Mouth: Mucous membranes are moist. No oral lesions.      Tongue: No lesions.      Pharynx: Oropharynx is clear.   Cardiovascular:      Rate and Rhythm: Normal rate and regular rhythm.   Pulmonary:      Effort: Pulmonary effort is normal.      Comments: Some mild end expiratory wheezes right upper lung  Musculoskeletal:      Cervical back: No tenderness.   Lymphadenopathy:      Cervical: No cervical adenopathy.   Neurological:      Mental Status: She is alert.   Psychiatric:         Mood and Affect: Mood normal.         Behavior: Behavior normal.         Assessment/Plan   Problem List Items Addressed This Visit    None  Visit Diagnoses         Codes    Community acquired pneumonia of left lower lobe " of lung    -  Primary J18.9    Relevant Medications    predniSONE (Deltasone) 10 mg tablet    Bronchitis     J40

## 2024-10-26 ENCOUNTER — OFFICE VISIT (OUTPATIENT)
Dept: PRIMARY CARE | Facility: CLINIC | Age: 44
End: 2024-10-26
Payer: COMMERCIAL

## 2024-10-26 ENCOUNTER — APPOINTMENT (OUTPATIENT)
Dept: RADIOLOGY | Facility: HOSPITAL | Age: 44
End: 2024-10-26
Payer: COMMERCIAL

## 2024-10-26 ENCOUNTER — HOSPITAL ENCOUNTER (EMERGENCY)
Facility: HOSPITAL | Age: 44
Discharge: HOME | End: 2024-10-26
Attending: EMERGENCY MEDICINE
Payer: COMMERCIAL

## 2024-10-26 VITALS
BODY MASS INDEX: 31.71 KG/M2 | HEART RATE: 86 BPM | DIASTOLIC BLOOD PRESSURE: 71 MMHG | TEMPERATURE: 97.7 F | SYSTOLIC BLOOD PRESSURE: 103 MMHG | HEIGHT: 67 IN | OXYGEN SATURATION: 93 % | WEIGHT: 202 LBS

## 2024-10-26 VITALS
SYSTOLIC BLOOD PRESSURE: 128 MMHG | RESPIRATION RATE: 18 BRPM | TEMPERATURE: 97.2 F | OXYGEN SATURATION: 99 % | HEART RATE: 81 BPM | HEIGHT: 67 IN | BODY MASS INDEX: 31.39 KG/M2 | DIASTOLIC BLOOD PRESSURE: 93 MMHG | WEIGHT: 200 LBS

## 2024-10-26 DIAGNOSIS — J40 BRONCHITIS: Primary | ICD-10-CM

## 2024-10-26 DIAGNOSIS — R05.1 ACUTE COUGH: Primary | ICD-10-CM

## 2024-10-26 LAB — POC SARS-COV-2 AG BINAX: NORMAL

## 2024-10-26 PROCEDURE — 2500000001 HC RX 250 WO HCPCS SELF ADMINISTERED DRUGS (ALT 637 FOR MEDICARE OP): Performed by: EMERGENCY MEDICINE

## 2024-10-26 PROCEDURE — 71046 X-RAY EXAM CHEST 2 VIEWS: CPT

## 2024-10-26 PROCEDURE — 87811 SARS-COV-2 COVID19 W/OPTIC: CPT | Performed by: FAMILY MEDICINE

## 2024-10-26 PROCEDURE — 94640 AIRWAY INHALATION TREATMENT: CPT

## 2024-10-26 PROCEDURE — 99214 OFFICE O/P EST MOD 30 MIN: CPT | Performed by: FAMILY MEDICINE

## 2024-10-26 PROCEDURE — 3008F BODY MASS INDEX DOCD: CPT | Performed by: FAMILY MEDICINE

## 2024-10-26 PROCEDURE — 2500000002 HC RX 250 W HCPCS SELF ADMINISTERED DRUGS (ALT 637 FOR MEDICARE OP, ALT 636 FOR OP/ED): Performed by: EMERGENCY MEDICINE

## 2024-10-26 PROCEDURE — 71046 X-RAY EXAM CHEST 2 VIEWS: CPT | Performed by: RADIOLOGY

## 2024-10-26 PROCEDURE — 99283 EMERGENCY DEPT VISIT LOW MDM: CPT | Mod: 25

## 2024-10-26 RX ORDER — BENZONATATE 100 MG/1
200 CAPSULE ORAL ONCE
Status: COMPLETED | OUTPATIENT
Start: 2024-10-26 | End: 2024-10-26

## 2024-10-26 RX ORDER — AMOXICILLIN AND CLAVULANATE POTASSIUM 875; 125 MG/1; MG/1
1 TABLET, FILM COATED ORAL ONCE
Status: COMPLETED | OUTPATIENT
Start: 2024-10-26 | End: 2024-10-26

## 2024-10-26 RX ORDER — IPRATROPIUM BROMIDE AND ALBUTEROL SULFATE 2.5; .5 MG/3ML; MG/3ML
3 SOLUTION RESPIRATORY (INHALATION) ONCE
Status: COMPLETED | OUTPATIENT
Start: 2024-10-26 | End: 2024-10-26

## 2024-10-26 RX ORDER — AMOXICILLIN AND CLAVULANATE POTASSIUM 875; 125 MG/1; MG/1
1 TABLET, FILM COATED ORAL EVERY 12 HOURS
Qty: 14 TABLET | Refills: 0 | Status: SHIPPED | OUTPATIENT
Start: 2024-10-26 | End: 2024-11-02

## 2024-10-26 RX ORDER — BENZONATATE 200 MG/1
200 CAPSULE ORAL 3 TIMES DAILY PRN
Qty: 21 CAPSULE | Refills: 0 | Status: SHIPPED | OUTPATIENT
Start: 2024-10-26 | End: 2024-11-02

## 2024-10-26 RX ORDER — MOXIFLOXACIN HYDROCHLORIDE 400 MG/1
400 TABLET ORAL DAILY
Qty: 7 TABLET | Refills: 0 | Status: SHIPPED | OUTPATIENT
Start: 2024-10-26 | End: 2024-11-02

## 2024-10-26 RX ADMIN — BENZONATATE 200 MG: 100 CAPSULE ORAL at 11:23

## 2024-10-26 RX ADMIN — IPRATROPIUM BROMIDE AND ALBUTEROL SULFATE 3 ML: .5; 3 SOLUTION RESPIRATORY (INHALATION) at 11:23

## 2024-10-26 RX ADMIN — AMOXICILLIN AND CLAVULANATE POTASSIUM 1 TABLET: 875; 125 TABLET, FILM COATED ORAL at 13:25

## 2024-10-26 ASSESSMENT — PAIN DESCRIPTION - LOCATION: LOCATION: HEAD

## 2024-10-26 ASSESSMENT — PAIN - FUNCTIONAL ASSESSMENT: PAIN_FUNCTIONAL_ASSESSMENT: 0-10

## 2024-10-26 ASSESSMENT — LIFESTYLE VARIABLES
TOTAL SCORE: 0
HAVE PEOPLE ANNOYED YOU BY CRITICIZING YOUR DRINKING: NO
EVER FELT BAD OR GUILTY ABOUT YOUR DRINKING: NO
HAVE YOU EVER FELT YOU SHOULD CUT DOWN ON YOUR DRINKING: NO
EVER HAD A DRINK FIRST THING IN THE MORNING TO STEADY YOUR NERVES TO GET RID OF A HANGOVER: NO

## 2024-10-26 ASSESSMENT — PAIN SCALES - GENERAL: PAINLEVEL_OUTOF10: 2

## 2024-10-26 ASSESSMENT — ENCOUNTER SYMPTOMS: COUGH: 1

## 2024-10-26 NOTE — PATIENT INSTRUCTIONS
Advise evaluation in the ER for failure of outpatient therapy for left lung pneumonia. As always, you should review all possible adverse effects of any medication, prior to taking it. Please have the chest X-ray done, which Dr. Bills ordered, about 6 weeks from now.    Please return or seek medical attention if symptoms persist, change, worsen, or return. For emergencies, call 9-1-1 or go to the nearest Emergency Room. Please schedule additional problem-focused appointment(s) to address additional problem(s).    Avoid taking Biotin (a vitamin, shows up particularly in hair/nail supplements) for a week prior to any blood tests, as it can interfere with certain results. Fasting for labs means 12 hours, nothing to eat or drink, except water and medications, unless directed otherwise.    For assistance with scheduling referrals or consultations, please call [phone]. For laboratory, radiology, and other tests, please call [phone] ([phone] for pediatrics). Please review prescription inserts and published information for possible adverse effects of all medications. Return after testing or consultation to review results and recommendations, if symptoms persist, change, worsen, or return, or if you have any question or concern. If you do not get results within 7-10 days, or you have any question or concern, please send a message, call the office ([phone]), or return to the office for a follow-up appointment. For non-emergencies, you may call the office. For emergencies, call 9-1-1 or go to the nearest Emergency Department. Please schedule additional appointment(s) to address concern(s) not addressed today. An annual Wellness visit is strongly recommended. A Wellness visit should be dedicated to addressing routine health maintenance matters (e.g., cancer screenings, cardiovascular screening, etc.). Problem-focused visits, typically prompted by symptoms or specific concerns, are usually conducted  separately, particularly if multiple or complex problems need to be addressed.    In general, results are not released or discussed over the telephone, but at an appointment or via  [employer]. Results of tests done through [facility] are released via  [employer] (see below).  [email or web address]   [employer] support line: 334.964.1973

## 2024-10-26 NOTE — ED TRIAGE NOTES
Patient here for shortness of breath October 2nd was diagnosed with PNA. Has tried Amoxicillin, Prednisone x2, doxy and a inhaler but hasn't felt better Recdent travel to [place]. Neg Covid test this AM. Endorses ear pain, sinus pain and SOB. Sent to ED for CXR

## 2024-10-26 NOTE — PROGRESS NOTES
Subjective   Patient ID: Kaya Quintanilla is a 44 y.o. female who presents for Sick Visit (Kaya who is a Dr. Bills patient is here today for SDS visit with c/o head congestion, B/L ear pain, SOB and tightness is her lungs, persistent and productive cough, headache. Pt denies fever and sore throat . /Pt was seen on 10/7/24 and was given abx and prednisone however patient symptoms have not subsided. ).  HPI Historian(s): Self    c/o head congestion. Cough productive of white sputum, green nasal discharge. Taste is diminished. Sinuses very plugged up. Denies fever. Headache. Hoarse voice. Completed Prednisone on Wednesday. SOB, chest tightness, cough, had gotten better earlier this week a day or two prior to completing the Prednisone. Current symptoms started to return/worsen/occur yesterday. Never had a runny nose and head congestion until yesterday or the day before.   Denies fever, chills, sweats.  Inhaler helps.  Last COVID test in September.  Her 4-year-old started with a cough also yesterday, attends .    Review of Systems   Respiratory:  Positive for cough.    All other systems reviewed and are negative.    No LMP recorded.    Patient Care Team:  Ada Bills MD as PCP - General (Family Medicine)  Ada Bills MD as PCP - [facility] PCP    Current Outpatient Medications   Medication Instructions    albuterol (Ventolin HFA) 90 mcg/actuation inhaler 2 puffs, inhalation, Every 4 hours PRN    brimonidine (AlphaGAN P) 0.1 % ophthalmic solution     cyclobenzaprine (FLEXERIL) 10 mg, oral, Nightly PRN    estradiol (Vivelle-Dot) 0.025 mg/24 hr patch 1 patch, transdermal, 2 times weekly    Jublia 10 % solution with applicator     moxifloxacin (AVELOX) 400 mg, oral, Daily    multivitamin tablet 1 tablet, Daily    progesterone (Prometrium) 200 mg capsule Take for the first 12 days of the month    rizatriptan MLT (MAXALT-MLT) 10 mg, oral, Once as needed, May repeat in 2 hours if unresolved. Do not  "exceed 30 mg in 24 hours.    terbinafine (LAMISIL) 250 mg, Daily       Objective   /71 (BP Location: Left arm, Patient Position: Sitting)   Pulse 86   Temp 36.5 °C (97.7 °F) (Temporal)   Ht 1.702 m (5' 7\")   Wt 91.6 kg (202 lb)   SpO2 93%   BMI 31.64 kg/m²           Physical Exam  Vitals and nursing note reviewed.   Constitutional:       General: She is not in acute distress.     Appearance: Normal appearance. She is not diaphoretic.      Comments: No assistive device presently being used.   HENT:      Head: Normocephalic and atraumatic.      Right Ear: Tympanic membrane, ear canal and external ear normal. There is no impacted cerumen.      Left Ear: Tympanic membrane, ear canal and external ear normal. There is no impacted cerumen.      Nose: Congestion and rhinorrhea present.      Mouth/Throat:      Mouth: Mucous membranes are moist.      Pharynx: Oropharynx is clear. No oropharyngeal exudate or posterior oropharyngeal erythema.   Eyes:      General: No scleral icterus.     Extraocular Movements: Extraocular movements intact.      Conjunctiva/sclera: Conjunctivae normal.   Cardiovascular:      Rate and Rhythm: Normal rate and regular rhythm.      Heart sounds: Normal heart sounds.   Pulmonary:      Effort: Pulmonary effort is normal. No respiratory distress.      Breath sounds: Wheezing (diffuse L>R) and rhonchi (left lung) present. No rales.   Musculoskeletal:      Cervical back: Normal range of motion and neck supple. No tenderness.      Right lower leg: No edema.      Left lower leg: No edema.   Lymphadenopathy:      Cervical: No cervical adenopathy.   Skin:     General: Skin is warm and dry.      Coloration: Skin is not jaundiced.   Neurological:      General: No focal deficit present.      Mental Status: She is alert and oriented to person, place, and time. Mental status is at baseline.   Psychiatric:         Mood and Affect: Mood normal.         Behavior: Behavior normal.         Thought Content: " Thought content normal.         Assessment & Plan  Acute cough  Concern for persistent left lung pneumonia with likely superimposed viral URI. Advised evaluation in ER due to concern for failure of outpatient therapy of left pneumonia; declines. Check CXR, try Avelox. Reviewed adverse effect highlights. Reiterated need to present to the ER if not improving.  Orders:    POCT BinaxNOW Covid-19 Ag Card manually resulted    XR chest 2 views; Future    moxifloxacin (Avelox) 400 mg tablet; Take 1 tablet (400 mg) by mouth once daily for 7 days.

## 2024-10-27 DIAGNOSIS — J18.9 COMMUNITY ACQUIRED PNEUMONIA, UNSPECIFIED LATERALITY: Primary | ICD-10-CM

## 2024-10-27 NOTE — ED PROVIDER NOTES
HPI   Chief Complaint   Patient presents with    Shortness of Breath     October 2nd was diagnosed with PNA. Has tried Amoxicillin, Prednisone x2, doxy and a inhaler but hasn't felt better Recdent travel to [place]. Neg Covid test this AM. Endorses ear pain, sinus pain and SOB. Sent to ED for CXR        Kaya is a 44-year-old female who presents with cough congestion.  She was diagnosed with pneumonia earlier in the month by her doctor and placed on antibiotics and steroids.  She does not get better and she went to see her doctor for follow-up today and was sent to the emergency room.  He prescribed a medication which she has not yet started.  She denies any significant sick contacts at home.  She denies possibility of pregnancy.  She has had coughing jags which make her uncomfortable.  History comes from patient and EMR.              Patient History   Past Medical History:   Diagnosis Date    Acute tonsillitis, unspecified 09/05/2016    Acute tonsillitis    Other conditions influencing health status 08/15/2019    No significant past medical history     Past Surgical History:   Procedure Laterality Date    INCONTINENCE SURGERY      IR ABLATION VEIN      OTHER SURGICAL HISTORY  06/10/2021    Corneal lasik    OTHER SURGICAL HISTORY  08/15/2019    Appendectomy    OTHER SURGICAL HISTORY  08/15/2019    Nasal endoscopy     Family History   Problem Relation Name Age of Onset    Other (dilation of aortic arch) Son          congenital    Other (cardiac disorder) Paternal Grandmother      Other (cardiac disorder) Paternal Grandfather       Social History     Tobacco Use    Smoking status: Never    Smokeless tobacco: Never   Substance Use Topics    Alcohol use: Yes     Alcohol/week: 2.0 standard drinks of alcohol     Types: 2 Standard drinks or equivalent per week    Drug use: Never       Physical Exam   ED Triage Vitals [10/26/24 1002]   Temperature Heart Rate Respirations BP   36.2 °C (97.2 °F) 95 18 (!) 130/96      Pulse  Ox Temp Source Heart Rate Source Patient Position   97 % Skin Monitor Lying      BP Location FiO2 (%)     Right arm --       Physical Exam  Vitals reviewed.   Constitutional:       General: She is awake.      Appearance: Normal appearance.   HENT:      Head: Normocephalic.      Nose: Nose normal.   Cardiovascular:      Rate and Rhythm: Normal rate and regular rhythm.   Pulmonary:      Effort: Pulmonary effort is normal.      Comments: Some expiratory wheezing at times.  Patient is no respiratory distress.  Talking in full sentences.  Abdominal:      Palpations: Abdomen is soft.   Musculoskeletal:      Cervical back: Normal range of motion.   Skin:     General: Skin is warm.      Capillary Refill: Capillary refill takes less than 2 seconds.   Neurological:      Mental Status: She is alert.           ED Course & MDM   ED Course as of 10/26/24 2216   Sat Oct 26, 2024   1043 I had a long discussion with the patient about the differential after reviewing her films.  This could be viral.  Her doctor saw her today and started her on new antibiotic.  But then yesterday come to the ED.  Patient will be given some albuterol and some Tessalon Perles and attempt to make her feel better. [RZ]   1314 Reexam patient is a little better.  Will be given a spacer and Tessalon Perles.  Talked about risk benefits alternatives additional antibiotics we will try Augmentin.  She is stable be discharged home.  I gave the name of a pulmonologist for follow-up.  We will also get her a spacer. [RZ]      ED Course User Index  [RZ] Berny Stapleton MD         Diagnoses as of 10/26/24 2216   Bronchitis                 No data recorded     Abby Coma Scale Score: 15 (10/26/24 1017 : Anne-Marie Friedman RN)                           Medical Decision Making  I do not believe patient is a pulmonary embolus.  I believe this is bronchitis we will try other supportive medications to help her.  She has a prescription for antibiotics that she will start.   We will try Tessalon Perles and she has a puffer but no spacer we will give for that as well.  I encouraged her to follow-up and gave her the name of a pulmonologist.        Procedure  Procedures     Berny Stapleton MD  10/26/24 9616

## 2024-10-27 NOTE — RESULT ENCOUNTER NOTE
Persisting pneumonia on repeat CXR,  finish out antobiotic given  Let me know if sxs not improving in next week or so,  will need repeat CXR in a month to assess for clearing

## 2024-10-28 ENCOUNTER — APPOINTMENT (OUTPATIENT)
Dept: PRIMARY CARE | Facility: CLINIC | Age: 44
End: 2024-10-28
Payer: COMMERCIAL

## 2024-10-30 ENCOUNTER — APPOINTMENT (OUTPATIENT)
Dept: OTOLARYNGOLOGY | Facility: CLINIC | Age: 44
End: 2024-10-30
Payer: COMMERCIAL

## 2024-11-06 DIAGNOSIS — N95.1 VASOMOTOR SYMPTOMS DUE TO MENOPAUSE: ICD-10-CM

## 2024-11-06 RX ORDER — ESTRADIOL 0.03 MG/D
1 FILM, EXTENDED RELEASE TRANSDERMAL 2 TIMES WEEKLY
Qty: 8 PATCH | Refills: 0 | Status: SHIPPED | OUTPATIENT
Start: 2024-11-07 | End: 2024-12-05

## 2024-11-27 RX ORDER — ESTRADIOL 0.03 MG/D
1 FILM, EXTENDED RELEASE TRANSDERMAL 2 TIMES WEEKLY
Qty: 8 PATCH | Refills: 0 | Status: SHIPPED | OUTPATIENT
Start: 2024-11-28 | End: 2024-12-26

## 2024-12-11 ENCOUNTER — HOSPITAL ENCOUNTER (OUTPATIENT)
Dept: RADIOLOGY | Facility: HOSPITAL | Age: 44
Discharge: HOME | End: 2024-12-11
Payer: COMMERCIAL

## 2024-12-11 DIAGNOSIS — J18.9 COMMUNITY ACQUIRED PNEUMONIA, UNSPECIFIED LATERALITY: ICD-10-CM

## 2024-12-11 PROCEDURE — 71046 X-RAY EXAM CHEST 2 VIEWS: CPT | Performed by: RADIOLOGY

## 2024-12-11 PROCEDURE — 71046 X-RAY EXAM CHEST 2 VIEWS: CPT

## 2025-02-18 ENCOUNTER — TELEPHONE (OUTPATIENT)
Facility: CLINIC | Age: 45
End: 2025-02-18
Payer: COMMERCIAL

## 2025-02-18 ENCOUNTER — CLINICAL SUPPORT (OUTPATIENT)
Facility: CLINIC | Age: 45
End: 2025-02-18
Payer: COMMERCIAL

## 2025-02-18 DIAGNOSIS — R39.9 UTI SYMPTOMS: ICD-10-CM

## 2025-02-18 DIAGNOSIS — Z34.92 ENCOUNTER FOR PREGNANCY RELATED EXAMINATION IN SECOND TRIMESTER: ICD-10-CM

## 2025-02-18 LAB
POC BLOOD, URINE: ABNORMAL
POC GLUCOSE, URINE: NEGATIVE MG/DL
POC KETONES, URINE: NEGATIVE MG/DL
POC LEUKOCYTES, URINE: ABNORMAL
POC NITRITE,URINE: NEGATIVE
POC PROTEIN, URINE: ABNORMAL MG/DL

## 2025-02-18 PROCEDURE — 81003 URINALYSIS AUTO W/O SCOPE: CPT | Performed by: OBSTETRICS & GYNECOLOGY

## 2025-02-18 RX ORDER — NITROFURANTOIN 25; 75 MG/1; MG/1
100 CAPSULE ORAL 2 TIMES DAILY
Qty: 14 CAPSULE | Refills: 0 | Status: SHIPPED | OUTPATIENT
Start: 2025-02-18 | End: 2025-02-25

## 2025-02-18 RX ORDER — NITROFURANTOIN 25; 75 MG/1; MG/1
100 CAPSULE ORAL 2 TIMES DAILY
COMMUNITY
Start: 2025-02-18 | End: 2025-02-18 | Stop reason: SDUPTHER

## 2025-02-18 NOTE — TELEPHONE ENCOUNTER
Pt stopped in and dropped off a urine sample. Culture obtained, RX sent in for macrobid. Reviewed with Dr. Gottlieb.    NELLA Bertrand PCNA

## 2025-02-20 DIAGNOSIS — N95.1 VASOMOTOR SYMPTOMS DUE TO MENOPAUSE: ICD-10-CM

## 2025-02-20 LAB — BACTERIA UR CULT: NORMAL

## 2025-02-24 DIAGNOSIS — N95.1 VASOMOTOR SYMPTOMS DUE TO MENOPAUSE: ICD-10-CM

## 2025-02-24 RX ORDER — ESTRADIOL 0.03 MG/D
FILM, EXTENDED RELEASE TRANSDERMAL
Qty: 8 PATCH | Refills: 0 | Status: SHIPPED | OUTPATIENT
Start: 2025-02-24

## 2025-02-24 RX ORDER — ESTRADIOL 0.03 MG/D
PATCH, EXTENDED RELEASE TRANSDERMAL
Qty: 8 PATCH | Refills: 0 | Status: SHIPPED | OUTPATIENT
Start: 2025-02-24 | End: 2025-02-24

## 2025-03-11 ENCOUNTER — APPOINTMENT (OUTPATIENT)
Dept: PULMONOLOGY | Facility: CLINIC | Age: 45
End: 2025-03-11
Payer: COMMERCIAL

## 2025-03-24 ENCOUNTER — APPOINTMENT (OUTPATIENT)
Dept: UROLOGY | Facility: CLINIC | Age: 45
End: 2025-03-24
Payer: COMMERCIAL

## 2025-05-08 ENCOUNTER — APPOINTMENT (OUTPATIENT)
Facility: CLINIC | Age: 45
End: 2025-05-08
Payer: COMMERCIAL

## 2025-05-08 VITALS
HEIGHT: 67 IN | BODY MASS INDEX: 32.18 KG/M2 | DIASTOLIC BLOOD PRESSURE: 84 MMHG | WEIGHT: 205 LBS | SYSTOLIC BLOOD PRESSURE: 122 MMHG

## 2025-05-08 DIAGNOSIS — Z78.0 MENOPAUSE: Primary | ICD-10-CM

## 2025-05-08 DIAGNOSIS — Z12.4 ENCOUNTER FOR SCREENING FOR CERVICAL CANCER: ICD-10-CM

## 2025-05-08 DIAGNOSIS — Z01.419 WELL WOMAN EXAM WITH ROUTINE GYNECOLOGICAL EXAM: ICD-10-CM

## 2025-05-08 DIAGNOSIS — Z12.4 CERVICAL CANCER SCREENING: ICD-10-CM

## 2025-05-08 PROCEDURE — 87626 HPV SEP HI-RSK TYP&POOL RSLT: CPT

## 2025-05-08 RX ORDER — AMOXICILLIN AND CLAVULANATE POTASSIUM 875; 125 MG/1; MG/1
875 TABLET, FILM COATED ORAL 2 TIMES DAILY
COMMUNITY
Start: 2025-04-29 | End: 2025-05-13

## 2025-05-08 RX ORDER — EMU OIL
OIL (ML) MISCELLANEOUS
COMMUNITY

## 2025-05-08 RX ORDER — TRETINOIN 0.25 MG/G
CREAM TOPICAL
COMMUNITY
Start: 2025-01-30

## 2025-05-08 RX ORDER — PREDNISONE 10 MG/1
TABLET ORAL
COMMUNITY
Start: 2025-04-29

## 2025-05-08 RX ORDER — ESTRADIOL AND LEVONORGESTREL .045; .015 MG/D; MG/D
1 PATCH TRANSDERMAL WEEKLY
Qty: 4 PATCH | Refills: 11 | Status: SHIPPED | OUTPATIENT
Start: 2025-05-08 | End: 2026-05-08

## 2025-05-08 ASSESSMENT — COLUMBIA-SUICIDE SEVERITY RATING SCALE - C-SSRS
1. IN THE PAST MONTH, HAVE YOU WISHED YOU WERE DEAD OR WISHED YOU COULD GO TO SLEEP AND NOT WAKE UP?: NO
2. HAVE YOU ACTUALLY HAD ANY THOUGHTS OF KILLING YOURSELF?: NO
6. HAVE YOU EVER DONE ANYTHING, STARTED TO DO ANYTHING, OR PREPARED TO DO ANYTHING TO END YOUR LIFE?: NO

## 2025-05-08 ASSESSMENT — ENCOUNTER SYMPTOMS
FATIGUE: 0
CONSTIPATION: 0
VOMITING: 0
SHORTNESS OF BREATH: 0
NAUSEA: 0
FEVER: 0
OCCASIONAL FEELINGS OF UNSTEADINESS: 0
DIARRHEA: 0
ABDOMINAL PAIN: 0
DEPRESSION: 0
DIZZINESS: 0
COUGH: 0
PALPITATIONS: 0
LOSS OF SENSATION IN FEET: 0
LIGHT-HEADEDNESS: 0

## 2025-05-08 ASSESSMENT — LIFESTYLE VARIABLES
HOW OFTEN DO YOU HAVE SIX OR MORE DRINKS ON ONE OCCASION: NEVER
HOW MANY STANDARD DRINKS CONTAINING ALCOHOL DO YOU HAVE ON A TYPICAL DAY: PATIENT DOES NOT DRINK
HOW OFTEN DO YOU HAVE A DRINK CONTAINING ALCOHOL: NEVER
AUDIT-C TOTAL SCORE: 0
SKIP TO QUESTIONS 9-10: 1

## 2025-05-08 ASSESSMENT — PATIENT HEALTH QUESTIONNAIRE - PHQ9
2. FEELING DOWN, DEPRESSED OR HOPELESS: NOT AT ALL
SUM OF ALL RESPONSES TO PHQ9 QUESTIONS 1 AND 2: 0
1. LITTLE INTEREST OR PLEASURE IN DOING THINGS: NOT AT ALL

## 2025-05-08 ASSESSMENT — PAIN SCALES - GENERAL: PAINLEVEL_OUTOF10: 0-NO PAIN

## 2025-05-08 NOTE — PROGRESS NOTES
Subjective   Patient ID: Kaya Quintanilla is a 45 y.o. female who presents for Well Women Visit (2021 : ASC-US, HPV negative/HRT questions).  HPI    Periods are irregular, every 2 weeks to 2 months and last 5-10 days. Has some night sweats. On HRT, overall happy and would like to continue.   Last pap 2021 ASCUS, HPV neg  Last mammogram 1 year ago, normal.         Review of Systems   Constitutional:  Negative for fatigue and fever.   Respiratory:  Negative for cough and shortness of breath.    Cardiovascular:  Negative for chest pain and palpitations.   Gastrointestinal:  Negative for abdominal pain, constipation, diarrhea, nausea and vomiting.   Endocrine: Negative for cold intolerance and heat intolerance.   Genitourinary:  Negative for dyspareunia, pelvic pain, vaginal discharge and vaginal pain.   Neurological:  Negative for dizziness and light-headedness.       Objective   Physical Exam  Vitals reviewed.   Constitutional:       Appearance: Normal appearance.   Neck:      Thyroid: No thyroid mass, thyromegaly or thyroid tenderness.   Cardiovascular:      Rate and Rhythm: Normal rate and regular rhythm.      Heart sounds: Normal heart sounds.   Pulmonary:      Effort: Pulmonary effort is normal.      Breath sounds: Normal breath sounds.   Chest:   Breasts:     Right: Normal. No mass.      Left: Normal. No mass.   Abdominal:      General: Bowel sounds are normal.      Palpations: Abdomen is soft.      Tenderness: There is no abdominal tenderness.   Genitourinary:     General: Normal vulva.      Vagina: Normal.      Cervix: Normal.      Uterus: Normal.       Adnexa: Right adnexa normal.   Musculoskeletal:         General: Normal range of motion.      Cervical back: No tenderness.   Skin:     General: Skin is warm.   Neurological:      General: No focal deficit present.      Mental Status: She is alert and oriented to person, place, and time.   Psychiatric:         Attention and Perception: Attention normal.          Behavior: Behavior normal.       Assessment/Plan   Diagnoses and all orders for this visit:  Menopause  -     estradioL-levonorgestreL (Climara Pro) 0.045-0.015 mg/24 hr; Place 1 patch over 7 days on the skin 1 (one) time per week.  Well woman exam with routine gynecological exam  Cervical cancer screening  Encounter for screening for cervical cancer  -     THINPREP PAP TEST  - Reviewed healthy eating habits and exercise. Recommended 30 min a day at least 3 days a week including weight bearing exercise.   - Recommended self breast exam  - RTO 1 year or as needed          CARISSA Haq-MIKAYLA 05/08/25 11:56 AM

## 2025-05-21 LAB
CYTOLOGY CMNT CVX/VAG CYTO-IMP: NORMAL
HPV HR 12 DNA GENITAL QL NAA+PROBE: POSITIVE
HPV HR GENOTYPES PNL CVX NAA+PROBE: POSITIVE
HPV16 DNA SPEC QL NAA+PROBE: NEGATIVE
HPV18 DNA SPEC QL NAA+PROBE: NEGATIVE
LAB AP HPV GENOTYPE QUESTION: YES
LAB AP HPV HR: NORMAL
LABORATORY COMMENT REPORT: NORMAL
LMP START DATE: NORMAL
PATH REPORT.TOTAL CANCER: NORMAL

## 2025-06-12 ENCOUNTER — APPOINTMENT (OUTPATIENT)
Dept: UROLOGY | Facility: CLINIC | Age: 45
End: 2025-06-12
Payer: COMMERCIAL

## 2025-06-12 DIAGNOSIS — N39.3 SUI (STRESS URINARY INCONTINENCE, FEMALE): ICD-10-CM

## 2025-06-12 DIAGNOSIS — N32.81 OAB (OVERACTIVE BLADDER): Primary | ICD-10-CM

## 2025-06-12 DIAGNOSIS — N95.1 VASOMOTOR SYMPTOMS DUE TO MENOPAUSE: ICD-10-CM

## 2025-06-12 RX ORDER — FESOTERODINE FUMARATE 4 MG/1
4 TABLET, FILM COATED, EXTENDED RELEASE ORAL DAILY
Qty: 30 TABLET | Refills: 1 | Status: SHIPPED | OUTPATIENT
Start: 2025-06-12 | End: 2025-08-11

## 2025-06-12 NOTE — PROGRESS NOTES
Virtual or Telephone Consent    An interactive audio and video telecommunication system which permits real time communications between the patient (at the originating site) and provider (at the distant site) was utilized to provide this telehealth service.   Verbal consent was requested and obtained from Kaya Quintanilla on this date, 06/13/25 for a telehealth visit and the patient's location was confirmed at the time of the visit.    HISTORY OF PRESENT ILLNESS:  Kaya Quintanilla is a 45 y.o. female who presents today for a virtual follow up visit. She reports that she has urgency, but she thinks it may be related to habit because she does use the restroom frequently to prevent incontinence. She is drinking more water than usual as well.           Past Medical History  She has a past medical history of Acute tonsillitis, unspecified (09/05/2016) and Other conditions influencing health status (08/15/2019).    Surgical History  She has a past surgical history that includes Other surgical history (06/10/2021); Other surgical history (08/15/2019); Other surgical history (08/15/2019); Incontinence surgery; IR ablation vein; and Sinus surgery.     Social History  She reports that she has never smoked. She has never used smokeless tobacco. She reports that she does not drink alcohol and does not use drugs.    Family History  Family History[1]     Allergies  Erythromycin, Diphenhydramine, Oxycodone, Adhesive tape-silicones, Azithromycin, and Ciclesonide      A comprehensive 10+ review of systems was negative except for: see hpi                  Assessment:  46 yo female with CASANDRA  s/p sling and cystoscopy 5/11/2022 and VSM      CASANDRA:  -doing well, all UI sx improved, no urgency and frequency       OAB:  -discussed limiting fluids to no more than 8 oz an hour  and avoiding irritants such as caffeine and alcohol   -trial of toviaz       VSM:  -continue Estradiol patch and progesterone 200 mg, started 5/2024      Follow up in 4  months         All questions and concerns were answered and addressed.  The patient expressed understanding and agrees with the plan.     Quirino Clark MD    Scribe Attestation  By signing my name below, I, Price Stanley   attest that this documentation has been prepared under the direction and in the presence of Quirino Clark MD.         [1]   Family History  Problem Relation Name Age of Onset    Other (dilation of aortic arch) Son          congenital    Other (cardiac disorder) Paternal Grandmother      Other (cardiac disorder) Paternal Grandfather

## 2025-07-22 ENCOUNTER — TELEPHONE (OUTPATIENT)
Facility: CLINIC | Age: 45
End: 2025-07-22
Payer: COMMERCIAL

## 2025-07-22 DIAGNOSIS — Z78.0 MENOPAUSE: ICD-10-CM

## 2025-07-22 DIAGNOSIS — Z78.0 MENOPAUSE: Primary | ICD-10-CM

## 2025-07-22 DIAGNOSIS — N32.81 OAB (OVERACTIVE BLADDER): ICD-10-CM

## 2025-07-22 RX ORDER — FESOTERODINE FUMARATE 4 MG/1
1 TABLET, FILM COATED, EXTENDED RELEASE ORAL DAILY
Qty: 30 TABLET | Refills: 1 | Status: SHIPPED | OUTPATIENT
Start: 2025-07-22

## 2025-07-22 RX ORDER — ESTRADIOL AND LEVONORGESTREL .045; .015 MG/D; MG/D
1 PATCH TRANSDERMAL WEEKLY
Qty: 4 PATCH | Refills: 11 | Status: SHIPPED | OUTPATIENT
Start: 2025-07-22 | End: 2026-07-22

## 2025-07-22 RX ORDER — FESOTERODINE FUMARATE 4 MG/1
4 TABLET, FILM COATED, EXTENDED RELEASE ORAL DAILY
Qty: 30 TABLET | Refills: 1 | Status: SHIPPED | OUTPATIENT
Start: 2025-07-22 | End: 2025-09-20

## 2025-07-22 NOTE — TELEPHONE ENCOUNTER
Pt is calling in for a refill on her Clamira   Her pharmacy is [facility] in [place]

## 2025-07-22 NOTE — TELEPHONE ENCOUNTER
Pt is calling in [facility] doesn't have the Clamira in stock and the cost is high and [facility] has offered the [place] patch and wants to make sure this is ok.

## 2025-07-23 RX ORDER — ESTRADIOL/NORETHINDRONE ACETATE TRANSDERMAL SYSTEM .05; .14 MG/D; MG/D
1 PATCH, EXTENDED RELEASE TRANSDERMAL 2 TIMES WEEKLY
Qty: 24 PATCH | Refills: 3 | Status: SHIPPED | OUTPATIENT
Start: 2025-07-24 | End: 2026-07-24

## 2025-08-14 DIAGNOSIS — N32.81 OAB (OVERACTIVE BLADDER): ICD-10-CM

## 2025-08-14 RX ORDER — FESOTERODINE FUMARATE 4 MG/1
1 TABLET, FILM COATED, EXTENDED RELEASE ORAL DAILY
Qty: 90 TABLET | Refills: 1 | Status: SHIPPED | OUTPATIENT
Start: 2025-08-14

## 2025-09-18 ENCOUNTER — APPOINTMENT (OUTPATIENT)
Dept: UROLOGY | Facility: CLINIC | Age: 45
End: 2025-09-18
Payer: COMMERCIAL

## 2026-05-14 ENCOUNTER — APPOINTMENT (OUTPATIENT)
Facility: CLINIC | Age: 46
End: 2026-05-14
Payer: COMMERCIAL